# Patient Record
Sex: MALE | Race: WHITE | Employment: OTHER | URBAN - METROPOLITAN AREA
[De-identification: names, ages, dates, MRNs, and addresses within clinical notes are randomized per-mention and may not be internally consistent; named-entity substitution may affect disease eponyms.]

---

## 2017-04-17 ENCOUNTER — OP HISTORICAL/CONVERTED ENCOUNTER (OUTPATIENT)
Dept: OTHER | Age: 77
End: 2017-04-17

## 2017-12-22 ENCOUNTER — HOSPITAL ENCOUNTER (OUTPATIENT)
Dept: CT IMAGING | Age: 77
Discharge: HOME | End: 2017-12-22
Attending: ORTHOPAEDIC SURGERY
Payer: MEDICARE

## 2017-12-22 DIAGNOSIS — M19.012 PRIMARY OSTEOARTHRITIS OF LEFT SHOULDER: ICD-10-CM

## 2017-12-22 PROCEDURE — 73200 CT UPPER EXTREMITY W/O DYE: CPT

## 2018-01-29 ENCOUNTER — HOSPITAL ENCOUNTER (OUTPATIENT)
Dept: PREADMISSION TESTING | Age: 78
Discharge: HOME | End: 2018-01-29
Payer: MEDICARE

## 2018-01-29 VITALS
OXYGEN SATURATION: 97 % | TEMPERATURE: 98.2 F | DIASTOLIC BLOOD PRESSURE: 75 MMHG | RESPIRATION RATE: 16 BRPM | HEART RATE: 80 BPM | WEIGHT: 189.6 LBS | SYSTOLIC BLOOD PRESSURE: 156 MMHG | BODY MASS INDEX: 26.54 KG/M2 | HEIGHT: 71 IN

## 2018-01-29 LAB
ABO + RH BLD: NORMAL
ALBUMIN SERPL-MCNC: 4 G/DL (ref 3.5–5)
ALBUMIN/GLOB SERPL: 1.4 {RATIO} (ref 1.1–2.2)
ALP SERPL-CCNC: 77 U/L (ref 45–117)
ALT SERPL-CCNC: 31 U/L (ref 12–78)
ANION GAP SERPL CALC-SCNC: 7 MMOL/L (ref 5–15)
APPEARANCE UR: CLEAR
APTT PPP: 29.4 SEC (ref 22.1–32.5)
AST SERPL-CCNC: 19 U/L (ref 15–37)
ATRIAL RATE: 74 BPM
BACTERIA URNS QL MICRO: NEGATIVE /HPF
BASOPHILS # BLD: 0 K/UL (ref 0–0.1)
BASOPHILS NFR BLD: 1 % (ref 0–1)
BILIRUB SERPL-MCNC: 0.4 MG/DL (ref 0.2–1)
BILIRUB UR QL: NEGATIVE
BLOOD GROUP ANTIBODIES SERPL: NORMAL
BUN SERPL-MCNC: 23 MG/DL (ref 6–20)
BUN/CREAT SERPL: 24 (ref 12–20)
CALCIUM SERPL-MCNC: 8.9 MG/DL (ref 8.5–10.1)
CALCULATED P AXIS, ECG09: 47 DEGREES
CALCULATED T AXIS, ECG11: 6 DEGREES
CHLORIDE SERPL-SCNC: 105 MMOL/L (ref 97–108)
CO2 SERPL-SCNC: 32 MMOL/L (ref 21–32)
COLOR UR: NORMAL
CREAT SERPL-MCNC: 0.94 MG/DL (ref 0.7–1.3)
DIAGNOSIS, 93000: NORMAL
DIFFERENTIAL METHOD BLD: ABNORMAL
EOSINOPHIL # BLD: 0.1 K/UL (ref 0–0.4)
EOSINOPHIL NFR BLD: 2 % (ref 0–7)
EPITH CASTS URNS QL MICRO: NORMAL /LPF
ERYTHROCYTE [DISTWIDTH] IN BLOOD BY AUTOMATED COUNT: 14 % (ref 11.5–14.5)
EST. AVERAGE GLUCOSE BLD GHB EST-MCNC: 103 MG/DL
GLOBULIN SER CALC-MCNC: 2.8 G/DL (ref 2–4)
GLUCOSE SERPL-MCNC: 94 MG/DL (ref 65–100)
GLUCOSE UR STRIP.AUTO-MCNC: NEGATIVE MG/DL
HBA1C MFR BLD: 5.2 % (ref 4.2–6.3)
HCT VFR BLD AUTO: 37.9 % (ref 36.6–50.3)
HGB BLD-MCNC: 12.3 G/DL (ref 12.1–17)
HGB UR QL STRIP: NEGATIVE
HYALINE CASTS URNS QL MICRO: NORMAL /LPF (ref 0–5)
IMM GRANULOCYTES # BLD: 0 K/UL (ref 0–0.04)
IMM GRANULOCYTES NFR BLD AUTO: 1 % (ref 0–0.5)
INR PPP: 1 (ref 0.9–1.1)
KETONES UR QL STRIP.AUTO: NEGATIVE MG/DL
LEUKOCYTE ESTERASE UR QL STRIP.AUTO: NEGATIVE
LYMPHOCYTES # BLD: 1.3 K/UL (ref 0.8–3.5)
LYMPHOCYTES NFR BLD: 25 % (ref 12–49)
MCH RBC QN AUTO: 28.9 PG (ref 26–34)
MCHC RBC AUTO-ENTMCNC: 32.5 G/DL (ref 30–36.5)
MCV RBC AUTO: 89 FL (ref 80–99)
MONOCYTES # BLD: 0.5 K/UL (ref 0–1)
MONOCYTES NFR BLD: 10 % (ref 5–13)
NEUTS SEG # BLD: 3.3 K/UL (ref 1.8–8)
NEUTS SEG NFR BLD: 62 % (ref 32–75)
NITRITE UR QL STRIP.AUTO: NEGATIVE
NRBC # BLD: 0 K/UL (ref 0–0.01)
NRBC BLD-RTO: 0 PER 100 WBC
P-R INTERVAL, ECG05: 162 MS
PH UR STRIP: 5.5 [PH] (ref 5–8)
PLATELET # BLD AUTO: 202 K/UL (ref 150–400)
PMV BLD AUTO: 9.1 FL (ref 8.9–12.9)
POTASSIUM SERPL-SCNC: 4.2 MMOL/L (ref 3.5–5.1)
PROT SERPL-MCNC: 6.8 G/DL (ref 6.4–8.2)
PROT UR STRIP-MCNC: NEGATIVE MG/DL
PROTHROMBIN TIME: 10.3 SEC (ref 9–11.1)
Q-T INTERVAL, ECG07: 362 MS
QRS DURATION, ECG06: 88 MS
QTC CALCULATION (BEZET), ECG08: 401 MS
RBC # BLD AUTO: 4.26 M/UL (ref 4.1–5.7)
RBC #/AREA URNS HPF: NORMAL /HPF (ref 0–5)
SODIUM SERPL-SCNC: 144 MMOL/L (ref 136–145)
SP GR UR REFRACTOMETRY: 1.02 (ref 1–1.03)
SPECIMEN EXP DATE BLD: NORMAL
THERAPEUTIC RANGE,PTTT: NORMAL SECS (ref 58–77)
UA: UC IF INDICATED,UAUC: NORMAL
UROBILINOGEN UR QL STRIP.AUTO: 0.2 EU/DL (ref 0.2–1)
VENTRICULAR RATE, ECG03: 74 BPM
WBC # BLD AUTO: 5.3 K/UL (ref 4.1–11.1)
WBC URNS QL MICRO: NORMAL /HPF (ref 0–4)

## 2018-01-29 PROCEDURE — 93005 ELECTROCARDIOGRAM TRACING: CPT

## 2018-01-29 PROCEDURE — 85610 PROTHROMBIN TIME: CPT | Performed by: ORTHOPAEDIC SURGERY

## 2018-01-29 PROCEDURE — 85730 THROMBOPLASTIN TIME PARTIAL: CPT | Performed by: ORTHOPAEDIC SURGERY

## 2018-01-29 PROCEDURE — 86900 BLOOD TYPING SEROLOGIC ABO: CPT | Performed by: ORTHOPAEDIC SURGERY

## 2018-01-29 PROCEDURE — 80053 COMPREHEN METABOLIC PANEL: CPT | Performed by: ORTHOPAEDIC SURGERY

## 2018-01-29 PROCEDURE — 36415 COLL VENOUS BLD VENIPUNCTURE: CPT | Performed by: ORTHOPAEDIC SURGERY

## 2018-01-29 PROCEDURE — 81001 URINALYSIS AUTO W/SCOPE: CPT | Performed by: ORTHOPAEDIC SURGERY

## 2018-01-29 PROCEDURE — 83036 HEMOGLOBIN GLYCOSYLATED A1C: CPT | Performed by: ORTHOPAEDIC SURGERY

## 2018-01-29 PROCEDURE — 85025 COMPLETE CBC W/AUTO DIFF WBC: CPT | Performed by: ORTHOPAEDIC SURGERY

## 2018-01-29 RX ORDER — VALSARTAN 320 MG/1
320 TABLET ORAL EVERY EVENING
COMMUNITY

## 2018-01-29 RX ORDER — SIMVASTATIN 10 MG/1
10 TABLET, FILM COATED ORAL
COMMUNITY
End: 2022-03-03

## 2018-01-29 RX ORDER — AMLODIPINE BESYLATE 5 MG/1
5 TABLET ORAL
COMMUNITY

## 2018-01-29 NOTE — PERIOP NOTES
[facility]  PREOPERATIVE INSTRUCTIONS    Surgery Date:   2/12/2018   Surgery arrival time given by surgeon: NO  (If [facility] staff will call you between 3pm - 7pm the day before surgery with your arrival time. If your surgery is on a Monday, we will call you the preceding Friday. Please call [phone] after 7pm if you did not receive your arrival time.)  1. Report  to the 2nd Floor Admitting Desk on the day of your surgery. Bring your insurance card, photo identification, and any copayment (if applicable). 2. You must have a responsible adult to drive you home and stay with you the first 24 hours after surgery if you are going home the same day of your surgery. 3. Nothing to eat or drink after midnight the night before surgery. This means NO water, gum, mints, coffee, juice, etc.    4. MEDICATIONS TO TAKE THE MORNING OF SURGERY WITH A SIP OF WATER:Take Amlodipine  Check with your cardiologist about stopping aspirin. Stop vitamins 7 days before. 5. No alcoholic beverages 24 hours before and after your surgery. 6. If you are being admitted to the hospital,please leave personal belongings/luggage in your car until you have an assigned hospital room number. ( The hospital discharge time is 12 PM NOON. Your adult  should be at the hospital prior to the noon discharge time unless otherwise instructed.)   7. STOP Aspirin and/or any non-steroidal anti-inflammatory drugs (i.e. Ibuprofen, Naproxen, Advil, Aleve) as directed by your surgeon. You may take Tylenol. Stop herbal supplements 1 week prior to  surgery. 8. If you are currently taking Plavix, Coumadin,or any other blood-thinning/ anticoagulant medication contact your surgeon for instructions. 9. Wear comfortable clothes. Wear your glasses instead of contacts. Please leave all money, jewelry and valuables at home. No make up, particularly mascara, the day of surgery. 10.  REMOVE ALL body piercings, rings,and jewelry and leave at home.   Wear your hair loose or down, no pony-tails, buns, or any metal hair clips. 11. If you shower the morning of surgery, please do not apply any lotions, powders, or deodorants afterwards. Do not shave any body area within 24 hours of your surgery. 12. Please follow all instructions to avoid any potential surgical cancellation. 13. Should your physical condition change, (i.e. fever, cold, flu, etc.) please notify your surgeon as soon as possible. 14. It is important to be on time. If a situation occurs where you may be delayed, please call:  [phone] / [phone] on the day of surgery. 15. The Preadmission Testing staff can be reached at 21 [phone]. 16.  Special Instructions:  · Use Chlorhexidine Care Fusion wash and sponges 3 days prior to surgery as instructed. · Incentive spirometer given with instructions to practice at home and bring back to the hospital on the day of surgery. · [facility] will contact you if your Hemoglobin A1C is greater than 7.5.  parking is complimentary Monday - Friday 7 am - 5 pm  · Bring PTA Medication list day of surgery with the last doses taken documented   · Do not bring medication bottles the day of surgery  16. The patient was contacted  in person. He  verbalize  understanding of all instructions does not  need reinforcement.

## 2018-01-29 NOTE — H&P
PAT Pre-Op History & Physical    Patient: Claire Bowman. MRN: [id number]          SSN: [id number]  YOB: 1940          Age: 70 y.o. Sex: male                Subjective:   Patient is a 70 y.o.  male who presents with history of chronic left shoulder pain that began about 2 years ago and has escalated over time. Describes his left shoulder pain as an intermittent ache but can be sharp with movement. Rates his pain 3/10. States that his left shoulder pain makes it difficult to sleep at night. Ct of left UE done 12/22/2017 showed:    Severe left shoulder osteoarthritis. Mild volume loss of the glenoid. Large glenohumeral joint effusion. No aggressive bone lesion. Has failed steroid joint injections and NSAIDs. Patient is right hand dominant. The patient was evaluated in the surgeon's office and it was determined that the most appropriate plan of care is to proceed with surgical intervention.   Patient's PCP Tiago Lucas III, MD            Past Medical History:   Diagnosis Date    Arthritis     Cataracts, bilateral     Chronic pain     back    Hypercholesteremia     Hypertension     Ill-defined condition     high cholesterol    Ill-defined condition     macular degeneration    Macular degeneration     Other ill-defined conditions(799.89) 1966    numerous blood transfusions - raymundo nam- injury to right leg    Other ill-defined conditions(799.89) 2011    4 units PRBCs in [place] for knee revision    Unspecified adverse effect of anesthesia 1966    convulsion post op leg surgery x 1    Unspecified adverse effect of anesthesia     no further convulsions with numerous subsequent surgeruies      Past Surgical History:   Procedure Laterality Date    HX ADENOIDECTOMY      HX ORTHOPAEDIC  1966    right leg-x 10 surgeries [facility] nam injury    HX ORTHOPAEDIC  1997    right knee replacement    HX ORTHOPAEDIC  2011, 2017    right knee revision    HX ORTHOPAEDIC  01/2015    left knee arthroscopy    HX ORTHOPAEDIC  11/2015    left knee replacement    HX ORTHOPAEDIC  03/2015    left elbow and carpal tunnel    HX OTHER SURGICAL  2004    normal cardiac cath      Prior to Admission medications    Medication Sig Start Date End Date Taking? Authorizing Provider   simvastatin (ZOCOR) 10 mg tablet Take 10 mg by mouth nightly. Yes Historical Provider   VIT A/VIT C/VIT E/ZINC/COPPER (PRESERVISION AREDS PO) Take 1 Tab by mouth two (2) times a day. Yes Historical Provider   valsartan (DIOVAN) 320 mg tablet Take 320 mg by mouth every evening. Yes Historical Provider   amLODIPine (NORVASC) 5 mg tablet Take 5 mg by mouth every morning. Yes Historical Provider   aspirin delayed-release 81 mg tablet Take 81 mg by mouth every evening. Historical Provider     Current Outpatient Prescriptions   Medication Sig    simvastatin (ZOCOR) 10 mg tablet Take 10 mg by mouth nightly.  VIT A/VIT C/VIT E/ZINC/COPPER (PRESERVISION AREDS PO) Take 1 Tab by mouth two (2) times a day.  valsartan (DIOVAN) 320 mg tablet Take 320 mg by mouth every evening.  amLODIPine (NORVASC) 5 mg tablet Take 5 mg by mouth every morning.  aspirin delayed-release 81 mg tablet Take 81 mg by mouth every evening. No current facility-administered medications for this encounter. No Known Allergies   Social History   Substance Use Topics    Smoking status: Never Smoker    Smokeless tobacco: Never Used    Alcohol use 2.5 oz/week     5 Glasses of wine per week      Comment:        History   Drug Use No     Family History   Problem Relation Age of Onset    Cancer Mother     Other Mother      polio in infancy    Hypertension Father     Heart Disease Father     No Known Problems Sister     No Known Problems Brother     Cancer Sister      panceratic    No Known Problems Sister          Review of Systems    Patient denies difficulty swallowing, mouth sores, or loose teeth.  Patient denies any recent dental procedures or any planned prior to surgery. Patient denies chest pain, tightness, pain radiating down left arm, palpitations. Denies dizziness, visual disturbances, or lightheadedness. Patient denies shortness of breath, wheezing, cough, fever, or chills. Patient denies diarrhea, constipation, or abdominal pain. Patient denies urinary problems including dysuria, hesitancy, urgency, or incontinence. Denies skin breakdown, rashes, insect bites or open area. C/o left shoulder pain and lower back pain, also pain right shin. Objective:   Patient Vitals for the past 24 hrs:   Temp Pulse Resp BP SpO2   18 0851 98.2 °F (36.8 °C) 80 16 156/75 97 %     Temp (24hrs), Av.2 °F (36.8 °C), Min:98.2 °F (36.8 °C), Max:98.2 °F (36.8 °C)    Body mass index is 26.44 kg/(m^2). Wt Readings from Last 1 Encounters:   18 86 kg (189 lb 9.5 oz)        Physical Exam:     General: Pleasant,  cooperative, no apparent distress, appears stated age. Eyes: Conjunctivae/corneas clear. EOMs intact. Nose: Nares normal.   Mouth/Throat: Lips, mucosa, and tongue normal. Teeth and gums normal.   Neck: Supple, symmetrical, trachea midline. Back: Symmetric   Lungs: Clear to auscultation bilaterally. Heart: Regular rate and rhythm, S1, S2 normal- + murmur - no click, gallop, or rub noted. Abdomen: Soft, non-tender. Bowel sounds normal. No distention. Musculoskeletal:  Left shoulder ROM limited by discomfort. Extremities:  Extremities normal, atraumatic, no cyanosis or edema. Calves                                 supple, non tender to palpation. Pulses: 2+ and symmetric bilateral upper extremities. Cap. refill <2 seconds   Skin: Skin color, texture, turgor normal.  No rashes or lesions. Neurologic: CN II-XII grossly intact. Alert and oriented x3.     Labs:   Recent Results (from the past 72 hour(s))   CBC WITH AUTOMATED DIFF    Collection Time: 18  9:48 AM   Result Value Ref Range WBC 5.3 4.1 - 11.1 K/uL    RBC 4.26 4.10 - 5.70 M/uL    HGB 12.3 12.1 - 17.0 g/dL    HCT 37.9 36.6 - 50.3 %    MCV 89.0 80.0 - 99.0 FL    MCH 28.9 26.0 - 34.0 PG    MCHC 32.5 30.0 - 36.5 g/dL    RDW 14.0 11.5 - 14.5 %    PLATELET 578 778 - 300 K/uL    MPV 9.1 8.9 - 12.9 FL    NRBC 0.0 0  WBC    ABSOLUTE NRBC 0.00 0.00 - 0.01 K/uL    NEUTROPHILS 62 32 - 75 %    LYMPHOCYTES 25 12 - 49 %    MONOCYTES 10 5 - 13 %    EOSINOPHILS 2 0 - 7 %    BASOPHILS 1 0 - 1 %    IMMATURE GRANULOCYTES 1 (H) 0.0 - 0.5 %    ABS. NEUTROPHILS 3.3 1.8 - 8.0 K/UL    ABS. LYMPHOCYTES 1.3 0.8 - 3.5 K/UL    ABS. MONOCYTES 0.5 0.0 - 1.0 K/UL    ABS. EOSINOPHILS 0.1 0.0 - 0.4 K/UL    ABS. BASOPHILS 0.0 0.0 - 0.1 K/UL    ABS. IMM. GRANS. 0.0 0.00 - 0.04 K/UL    DF AUTOMATED     METABOLIC PANEL, COMPREHENSIVE    Collection Time: 01/29/18  9:48 AM   Result Value Ref Range    Sodium 144 136 - 145 mmol/L    Potassium 4.2 3.5 - 5.1 mmol/L    Chloride 105 97 - 108 mmol/L    CO2 32 21 - 32 mmol/L    Anion gap 7 5 - 15 mmol/L    Glucose 94 65 - 100 mg/dL    BUN 23 (H) 6 - 20 MG/DL    Creatinine 0.94 0.70 - 1.30 MG/DL    BUN/Creatinine ratio 24 (H) 12 - 20      GFR est AA >60 >60 ml/min/1.73m2    GFR est non-AA >60 >60 ml/min/1.73m2    Calcium 8.9 8.5 - 10.1 MG/DL    Bilirubin, total 0.4 0.2 - 1.0 MG/DL    ALT (SGPT) 31 12 - 78 U/L    AST (SGOT) 19 15 - 37 U/L    Alk.  phosphatase 77 45 - 117 U/L    Protein, total 6.8 6.4 - 8.2 g/dL    Albumin 4.0 3.5 - 5.0 g/dL    Globulin 2.8 2.0 - 4.0 g/dL    A-G Ratio 1.4 1.1 - 2.2     HEMOGLOBIN A1C WITH EAG    Collection Time: 01/29/18  9:48 AM   Result Value Ref Range    Hemoglobin A1c 5.2 4.2 - 6.3 %    Est. average glucose 103 mg/dL   PROTHROMBIN TIME + INR    Collection Time: 01/29/18  9:48 AM   Result Value Ref Range    INR 1.0 0.9 - 1.1      Prothrombin time 10.3 9.0 - 11.1 sec   PTT    Collection Time: 01/29/18  9:48 AM   Result Value Ref Range    aPTT 29.4 22.1 - 32.5 sec    aPTT, therapeutic range 58.0 - 77.0 SECS   URINALYSIS W/ REFLEX CULTURE    Collection Time: 01/29/18  9:48 AM   Result Value Ref Range    Color YELLOW/STRAW      Appearance CLEAR CLEAR      Specific gravity 1.020 1.003 - 1.030      pH (UA) 5.5 5.0 - 8.0      Protein NEGATIVE  NEG mg/dL    Glucose NEGATIVE  NEG mg/dL    Ketone NEGATIVE  NEG mg/dL    Bilirubin NEGATIVE  NEG      Blood NEGATIVE  NEG      Urobilinogen 0.2 0.2 - 1.0 EU/dL    Nitrites NEGATIVE  NEG      Leukocyte Esterase NEGATIVE  NEG      WBC 0-4 0 - 4 /hpf    RBC 0-5 0 - 5 /hpf    Epithelial cells FEW FEW /lpf    Bacteria NEGATIVE  NEG /hpf    UA:UC IF INDICATED CULTURE NOT INDICATED BY UA RESULT CNI      Hyaline cast 0-2 0 - 5 /lpf   TYPE & SCREEN    Collection Time: 01/29/18  9:48 AM   Result Value Ref Range    Crossmatch Expiration 02/12/2018     ABO/Rh(D) Taryn More POSITIVE     Antibody screen NEG    EKG, 12 LEAD, INITIAL    Collection Time: 01/29/18 10:30 AM   Result Value Ref Range    Ventricular Rate 74 BPM    Atrial Rate 74 BPM    P-R Interval 162 ms    QRS Duration 88 ms    Q-T Interval 362 ms    QTC Calculation (Bezet) 401 ms    Calculated P Axis 47 degrees    Calculated T Axis 6 degrees    Diagnosis       Normal sinus rhythm  Cannot rule out Septal infarct , age undetermined  Abnormal ECG  When compared with ECG of 3/9/15  No significant change    Confirmed by Jennifer Quintana MD., Gabby Carmona ([id number]) on 1/29/2018 11:27:51 AM         Assessment:     Primary osteoarthritis of left total shoulder arthroplasty with biceps tenodesis and axillary nerve dissection. shoulder. Plan:     Scheduled for left shoulder total arthroplasty biceps tenodesis and axillary node dissection. Labs and EKG done per surgeon's orders. Lab results and EKG reviewed- unremarkable. MRSA pending. Patient has appointment with cardiology (Dr. Alvin Hand) 01/30/2018. Will request copy of note after visit.         Georgina Trammell, BRAXTON

## 2018-01-30 LAB
BACTERIA SPEC CULT: ABNORMAL
BACTERIA SPEC CULT: ABNORMAL
SERVICE CMNT-IMP: ABNORMAL

## 2018-01-30 NOTE — PROGRESS NOTES
Received last cardiology note prior to PAT visit- dated 07/12/2017 by Dr. Hernán Xiong. Note mentions history of TIA. Called patient to clarify- he states he has episode of numbness on side of lip. No cause ever identified- Was never given diagnosis of TIA per patient report. Received cardiac clearance note from Dr. Bashir Siemens office after 1/30 appointment- note placed on chart.

## 2018-01-31 RX ORDER — MUPIROCIN 20 MG/G
OINTMENT TOPICAL 2 TIMES DAILY
Qty: 22 G | Refills: 0 | Status: SHIPPED | OUTPATIENT
Start: 2018-01-31 | End: 2018-02-05

## 2018-01-31 NOTE — ROUTINE PROCESS
Notification of Positive MSSA and Treatment Ordered by Preadmission Testing Nurse Practitioner      Patient Name:  Bernard Rocha MRN: [id number]  : 1940    Surgeon: Dr. Sanju Pena  Date of surgery: 2018  Procedure: Left TSA    Allergies: No Known Allergies    MRSA results: All Micro Results     Procedure Component Value Units Date/Time    MRSA CULTURE NARES [[id number]]  (Abnormal) Collected:  18 0856    Order Status:  Completed Specimen:  Nares Updated:  18 1535     Special Requests: NO SPECIAL REQUESTS        Culture result:         MRSA NOT PRESENT. Apparent Staphylococus aureus (not MRSA noted).  (A)                        Treatment ordered: Bactroban ointment 2%- apply intranasal twice daily for 5 days    Date patient notified of results / treatment prescribed: 2018- spoke with wife Herbert Galindo)    The patient was instructed to add medication and date they began treatment to their \"Prior to Admission Medication\" list given to them in [address] [place] [address],

## 2018-02-09 ENCOUNTER — ANESTHESIA EVENT (OUTPATIENT)
Dept: SURGERY | Age: 78
DRG: 483 | End: 2018-02-09
Payer: MEDICARE

## 2018-02-12 ENCOUNTER — HOSPITAL ENCOUNTER (INPATIENT)
Age: 78
LOS: 1 days | Discharge: HOME | DRG: 483 | End: 2018-02-13
Attending: ORTHOPAEDIC SURGERY | Admitting: ORTHOPAEDIC SURGERY
Payer: MEDICARE

## 2018-02-12 ENCOUNTER — APPOINTMENT (OUTPATIENT)
Dept: GENERAL RADIOLOGY | Age: 78
DRG: 483 | End: 2018-02-12
Attending: ORTHOPAEDIC SURGERY
Payer: MEDICARE

## 2018-02-12 ENCOUNTER — ANESTHESIA (OUTPATIENT)
Dept: SURGERY | Age: 78
DRG: 483 | End: 2018-02-12
Payer: MEDICARE

## 2018-02-12 DIAGNOSIS — M19.012 PRIMARY OSTEOARTHRITIS OF LEFT SHOULDER: Primary | ICD-10-CM

## 2018-02-12 PROCEDURE — 65270000029 HC RM PRIVATE

## 2018-02-12 PROCEDURE — 77030018883 HC BLD SAW SAG4 STRY -B: Performed by: ORTHOPAEDIC SURGERY

## 2018-02-12 PROCEDURE — 74011000250 HC RX REV CODE- 250

## 2018-02-12 PROCEDURE — 77030003652 HC NDL RC MINI BIOM -B: Performed by: ORTHOPAEDIC SURGERY

## 2018-02-12 PROCEDURE — 3E0T3BZ INTRODUCTION OF ANESTHETIC AGENT INTO PERIPHERAL NERVES AND PLEXI, PERCUTANEOUS APPROACH: ICD-10-PCS | Performed by: ANESTHESIOLOGY

## 2018-02-12 PROCEDURE — 74011250636 HC RX REV CODE- 250/636: Performed by: ANESTHESIOLOGY

## 2018-02-12 PROCEDURE — 77030013079 HC BLNKT BAIR HGGR 3M -A: Performed by: NURSE ANESTHETIST, CERTIFIED REGISTERED

## 2018-02-12 PROCEDURE — C1713 ANCHOR/SCREW BN/BN,TIS/BN: HCPCS | Performed by: ORTHOPAEDIC SURGERY

## 2018-02-12 PROCEDURE — 36415 COLL VENOUS BLD VENIPUNCTURE: CPT | Performed by: ORTHOPAEDIC SURGERY

## 2018-02-12 PROCEDURE — 77030008684 HC TU ET CUF COVD -B: Performed by: NURSE ANESTHETIST, CERTIFIED REGISTERED

## 2018-02-12 PROCEDURE — 77030002933 HC SUT MCRYL J&J -A: Performed by: ORTHOPAEDIC SURGERY

## 2018-02-12 PROCEDURE — 77030020782 HC GWN BAIR PAWS FLX 3M -B

## 2018-02-12 PROCEDURE — 85018 HEMOGLOBIN: CPT | Performed by: ORTHOPAEDIC SURGERY

## 2018-02-12 PROCEDURE — 74011000250 HC RX REV CODE- 250: Performed by: ORTHOPAEDIC SURGERY

## 2018-02-12 PROCEDURE — 77030026438 HC STYL ET INTUB CARD -A: Performed by: NURSE ANESTHETIST, CERTIFIED REGISTERED

## 2018-02-12 PROCEDURE — 77030032497 HC WRP SHLDR WO BGS SOLM -B

## 2018-02-12 PROCEDURE — 76210000006 HC OR PH I REC 0.5 TO 1 HR: Performed by: ORTHOPAEDIC SURGERY

## 2018-02-12 PROCEDURE — 77030018673: Performed by: ORTHOPAEDIC SURGERY

## 2018-02-12 PROCEDURE — 0LS40ZZ REPOSITION LEFT UPPER ARM TENDON, OPEN APPROACH: ICD-10-PCS | Performed by: ORTHOPAEDIC SURGERY

## 2018-02-12 PROCEDURE — 77030011640 HC PAD GRND REM COVD -A: Performed by: ORTHOPAEDIC SURGERY

## 2018-02-12 PROCEDURE — 74011000272 HC RX REV CODE- 272: Performed by: ORTHOPAEDIC SURGERY

## 2018-02-12 PROCEDURE — 73020 X-RAY EXAM OF SHOULDER: CPT

## 2018-02-12 PROCEDURE — 77030013708 HC HNDPC SUC IRR PULS STRY –B: Performed by: ORTHOPAEDIC SURGERY

## 2018-02-12 PROCEDURE — 77030010507 HC ADH SKN DERMBND J&J -B: Performed by: ORTHOPAEDIC SURGERY

## 2018-02-12 PROCEDURE — 76010000162 HC OR TIME 1.5 TO 2 HR INTENSV-TIER 1: Performed by: ORTHOPAEDIC SURGERY

## 2018-02-12 PROCEDURE — 74011250636 HC RX REV CODE- 250/636

## 2018-02-12 PROCEDURE — 77030018836 HC SOL IRR NACL ICUM -A: Performed by: ORTHOPAEDIC SURGERY

## 2018-02-12 PROCEDURE — 77030013837 HC NERV BLK KT BBMI -B

## 2018-02-12 PROCEDURE — 77030002922 HC SUT FBRWRE ARTH -B: Performed by: ORTHOPAEDIC SURGERY

## 2018-02-12 PROCEDURE — 77030031139 HC SUT VCRL2 J&J -A: Performed by: ORTHOPAEDIC SURGERY

## 2018-02-12 PROCEDURE — 77030019908 HC STETH ESOPH SIMS -A: Performed by: ANESTHESIOLOGY

## 2018-02-12 PROCEDURE — 77030010785: Performed by: ORTHOPAEDIC SURGERY

## 2018-02-12 PROCEDURE — 74011000250 HC RX REV CODE- 250: Performed by: ANESTHESIOLOGY

## 2018-02-12 PROCEDURE — 74011250636 HC RX REV CODE- 250/636: Performed by: ORTHOPAEDIC SURGERY

## 2018-02-12 PROCEDURE — 77030003882 HC BIT DRL TWST BRSM -B: Performed by: ORTHOPAEDIC SURGERY

## 2018-02-12 PROCEDURE — 0RRK0JZ REPLACEMENT OF LEFT SHOULDER JOINT WITH SYNTHETIC SUBSTITUTE, OPEN APPROACH: ICD-10-PCS | Performed by: ORTHOPAEDIC SURGERY

## 2018-02-12 PROCEDURE — 77030020788: Performed by: ORTHOPAEDIC SURGERY

## 2018-02-12 PROCEDURE — 76060000034 HC ANESTHESIA 1.5 TO 2 HR: Performed by: ORTHOPAEDIC SURGERY

## 2018-02-12 PROCEDURE — 77030021303 HC BUR FLUT MIDAS BRSM -B: Performed by: ORTHOPAEDIC SURGERY

## 2018-02-12 PROCEDURE — 64415 NJX AA&/STRD BRCH PLXS IMG: CPT

## 2018-02-12 PROCEDURE — 77030010507 HC ADH SKN DERMBND J&J -B

## 2018-02-12 PROCEDURE — 74011250637 HC RX REV CODE- 250/637: Performed by: ANESTHESIOLOGY

## 2018-02-12 PROCEDURE — 77030028700 HC BLD TISS PLSM MEDT -E: Performed by: ORTHOPAEDIC SURGERY

## 2018-02-12 PROCEDURE — 74011250637 HC RX REV CODE- 250/637: Performed by: ORTHOPAEDIC SURGERY

## 2018-02-12 PROCEDURE — C1776 JOINT DEVICE (IMPLANTABLE): HCPCS | Performed by: ORTHOPAEDIC SURGERY

## 2018-02-12 DEVICE — IMPLANTABLE DEVICE: Type: IMPLANTABLE DEVICE | Site: SHOULDER | Status: FUNCTIONAL

## 2018-02-12 RX ORDER — POLYETHYLENE GLYCOL 3350 17 G/17G
17 POWDER, FOR SOLUTION ORAL DAILY
Status: DISCONTINUED | OUTPATIENT
Start: 2018-02-12 | End: 2018-02-13 | Stop reason: HOSPADM

## 2018-02-12 RX ORDER — VALSARTAN 80 MG/1
320 TABLET ORAL EVERY EVENING
Status: DISCONTINUED | OUTPATIENT
Start: 2018-02-12 | End: 2018-02-13 | Stop reason: HOSPADM

## 2018-02-12 RX ORDER — ACETAMINOPHEN 325 MG/1
975 TABLET ORAL ONCE
Status: COMPLETED | OUTPATIENT
Start: 2018-02-12 | End: 2018-02-12

## 2018-02-12 RX ORDER — HYDROMORPHONE HYDROCHLORIDE 2 MG/ML
0.5 INJECTION, SOLUTION INTRAMUSCULAR; INTRAVENOUS; SUBCUTANEOUS
Status: ACTIVE | OUTPATIENT
Start: 2018-02-12 | End: 2018-02-13

## 2018-02-12 RX ORDER — OXYCODONE HYDROCHLORIDE 5 MG/1
10 TABLET ORAL
Status: DISCONTINUED | OUTPATIENT
Start: 2018-02-12 | End: 2018-02-13 | Stop reason: HOSPADM

## 2018-02-12 RX ORDER — DEXAMETHASONE SODIUM PHOSPHATE 4 MG/ML
INJECTION, SOLUTION INTRA-ARTICULAR; INTRALESIONAL; INTRAMUSCULAR; INTRAVENOUS; SOFT TISSUE AS NEEDED
Status: DISCONTINUED | OUTPATIENT
Start: 2018-02-12 | End: 2018-02-12 | Stop reason: HOSPADM

## 2018-02-12 RX ORDER — SODIUM CHLORIDE, SODIUM LACTATE, POTASSIUM CHLORIDE, CALCIUM CHLORIDE 600; 310; 30; 20 MG/100ML; MG/100ML; MG/100ML; MG/100ML
125 INJECTION, SOLUTION INTRAVENOUS CONTINUOUS
Status: DISCONTINUED | OUTPATIENT
Start: 2018-02-12 | End: 2018-02-12 | Stop reason: HOSPADM

## 2018-02-12 RX ORDER — SODIUM CHLORIDE 0.9 % (FLUSH) 0.9 %
5-10 SYRINGE (ML) INJECTION AS NEEDED
Status: DISCONTINUED | OUTPATIENT
Start: 2018-02-12 | End: 2018-02-12 | Stop reason: HOSPADM

## 2018-02-12 RX ORDER — SODIUM CHLORIDE 0.9 % (FLUSH) 0.9 %
5-10 SYRINGE (ML) INJECTION EVERY 8 HOURS
Status: DISCONTINUED | OUTPATIENT
Start: 2018-02-12 | End: 2018-02-12 | Stop reason: HOSPADM

## 2018-02-12 RX ORDER — SUCCINYLCHOLINE CHLORIDE 20 MG/ML
INJECTION INTRAMUSCULAR; INTRAVENOUS AS NEEDED
Status: DISCONTINUED | OUTPATIENT
Start: 2018-02-12 | End: 2018-02-12 | Stop reason: HOSPADM

## 2018-02-12 RX ORDER — SODIUM CHLORIDE 0.9 % (FLUSH) 0.9 %
5-10 SYRINGE (ML) INJECTION AS NEEDED
Status: DISCONTINUED | OUTPATIENT
Start: 2018-02-12 | End: 2018-02-13 | Stop reason: HOSPADM

## 2018-02-12 RX ORDER — SIMVASTATIN 5 MG/1
10 TABLET, FILM COATED ORAL
Status: DISCONTINUED | OUTPATIENT
Start: 2018-02-12 | End: 2018-02-13 | Stop reason: HOSPADM

## 2018-02-12 RX ORDER — FAMOTIDINE 20 MG/1
20 TABLET, FILM COATED ORAL 2 TIMES DAILY
Status: DISCONTINUED | OUTPATIENT
Start: 2018-02-12 | End: 2018-02-13 | Stop reason: HOSPADM

## 2018-02-12 RX ORDER — PROPOFOL 10 MG/ML
INJECTION, EMULSION INTRAVENOUS AS NEEDED
Status: DISCONTINUED | OUTPATIENT
Start: 2018-02-12 | End: 2018-02-12 | Stop reason: HOSPADM

## 2018-02-12 RX ORDER — OXYCODONE HYDROCHLORIDE 5 MG/1
5 TABLET ORAL
Status: DISCONTINUED | OUTPATIENT
Start: 2018-02-12 | End: 2018-02-13 | Stop reason: HOSPADM

## 2018-02-12 RX ORDER — KETOROLAC TROMETHAMINE 30 MG/ML
15 INJECTION, SOLUTION INTRAMUSCULAR; INTRAVENOUS
Status: DISCONTINUED | OUTPATIENT
Start: 2018-02-12 | End: 2018-02-12 | Stop reason: HOSPADM

## 2018-02-12 RX ORDER — TRAMADOL HYDROCHLORIDE 50 MG/1
50 TABLET ORAL
Status: DISCONTINUED | OUTPATIENT
Start: 2018-02-12 | End: 2018-02-13 | Stop reason: HOSPADM

## 2018-02-12 RX ORDER — HYDROMORPHONE HYDROCHLORIDE 2 MG/ML
.5-1 INJECTION, SOLUTION INTRAMUSCULAR; INTRAVENOUS; SUBCUTANEOUS
Status: DISCONTINUED | OUTPATIENT
Start: 2018-02-12 | End: 2018-02-12 | Stop reason: HOSPADM

## 2018-02-12 RX ORDER — ONDANSETRON 2 MG/ML
INJECTION INTRAMUSCULAR; INTRAVENOUS AS NEEDED
Status: DISCONTINUED | OUTPATIENT
Start: 2018-02-12 | End: 2018-02-12 | Stop reason: HOSPADM

## 2018-02-12 RX ORDER — CEFAZOLIN SODIUM/WATER 2 G/20 ML
2 SYRINGE (ML) INTRAVENOUS ONCE
Status: COMPLETED | OUTPATIENT
Start: 2018-02-12 | End: 2018-02-12

## 2018-02-12 RX ORDER — AMOXICILLIN 250 MG
1 CAPSULE ORAL 2 TIMES DAILY
Status: DISCONTINUED | OUTPATIENT
Start: 2018-02-12 | End: 2018-02-13 | Stop reason: HOSPADM

## 2018-02-12 RX ORDER — FENTANYL CITRATE 50 UG/ML
INJECTION, SOLUTION INTRAMUSCULAR; INTRAVENOUS AS NEEDED
Status: DISCONTINUED | OUTPATIENT
Start: 2018-02-12 | End: 2018-02-12 | Stop reason: HOSPADM

## 2018-02-12 RX ORDER — ROCURONIUM BROMIDE 10 MG/ML
INJECTION, SOLUTION INTRAVENOUS AS NEEDED
Status: DISCONTINUED | OUTPATIENT
Start: 2018-02-12 | End: 2018-02-12 | Stop reason: HOSPADM

## 2018-02-12 RX ORDER — NALOXONE HYDROCHLORIDE 0.4 MG/ML
0.4 INJECTION, SOLUTION INTRAMUSCULAR; INTRAVENOUS; SUBCUTANEOUS AS NEEDED
Status: DISCONTINUED | OUTPATIENT
Start: 2018-02-12 | End: 2018-02-13 | Stop reason: HOSPADM

## 2018-02-12 RX ORDER — ROPIVACAINE HYDROCHLORIDE 5 MG/ML
INJECTION, SOLUTION EPIDURAL; INFILTRATION; PERINEURAL AS NEEDED
Status: DISCONTINUED | OUTPATIENT
Start: 2018-02-12 | End: 2018-02-12 | Stop reason: HOSPADM

## 2018-02-12 RX ORDER — EPHEDRINE SULFATE 50 MG/ML
INJECTION, SOLUTION INTRAVENOUS AS NEEDED
Status: DISCONTINUED | OUTPATIENT
Start: 2018-02-12 | End: 2018-02-12 | Stop reason: HOSPADM

## 2018-02-12 RX ORDER — SODIUM CHLORIDE 9 MG/ML
125 INJECTION, SOLUTION INTRAVENOUS CONTINUOUS
Status: DISPENSED | OUTPATIENT
Start: 2018-02-12 | End: 2018-02-13

## 2018-02-12 RX ORDER — CELECOXIB 100 MG/1
100 CAPSULE ORAL ONCE
Status: COMPLETED | OUTPATIENT
Start: 2018-02-12 | End: 2018-02-12

## 2018-02-12 RX ORDER — DIPHENHYDRAMINE HCL 12.5MG/5ML
12.5 ELIXIR ORAL
Status: ACTIVE | OUTPATIENT
Start: 2018-02-12 | End: 2018-02-13

## 2018-02-12 RX ORDER — FENTANYL CITRATE 50 UG/ML
25 INJECTION, SOLUTION INTRAMUSCULAR; INTRAVENOUS
Status: DISCONTINUED | OUTPATIENT
Start: 2018-02-12 | End: 2018-02-12 | Stop reason: HOSPADM

## 2018-02-12 RX ORDER — NEOSTIGMINE METHYLSULFATE 1 MG/ML
INJECTION INTRAVENOUS AS NEEDED
Status: DISCONTINUED | OUTPATIENT
Start: 2018-02-12 | End: 2018-02-12 | Stop reason: HOSPADM

## 2018-02-12 RX ORDER — AMLODIPINE BESYLATE 5 MG/1
5 TABLET ORAL
Status: DISCONTINUED | OUTPATIENT
Start: 2018-02-12 | End: 2018-02-12

## 2018-02-12 RX ORDER — LIDOCAINE HYDROCHLORIDE 10 MG/ML
0.1 INJECTION, SOLUTION EPIDURAL; INFILTRATION; INTRACAUDAL; PERINEURAL AS NEEDED
Status: DISCONTINUED | OUTPATIENT
Start: 2018-02-12 | End: 2018-02-12 | Stop reason: HOSPADM

## 2018-02-12 RX ORDER — ONDANSETRON 2 MG/ML
4 INJECTION INTRAMUSCULAR; INTRAVENOUS AS NEEDED
Status: DISCONTINUED | OUTPATIENT
Start: 2018-02-12 | End: 2018-02-12 | Stop reason: HOSPADM

## 2018-02-12 RX ORDER — ACETAMINOPHEN 325 MG/1
650 TABLET ORAL EVERY 6 HOURS
Status: DISCONTINUED | OUTPATIENT
Start: 2018-02-12 | End: 2018-02-13 | Stop reason: HOSPADM

## 2018-02-12 RX ORDER — AMLODIPINE BESYLATE 5 MG/1
5 TABLET ORAL DAILY
Status: DISCONTINUED | OUTPATIENT
Start: 2018-02-13 | End: 2018-02-13 | Stop reason: HOSPADM

## 2018-02-12 RX ORDER — LIDOCAINE HYDROCHLORIDE 20 MG/ML
INJECTION, SOLUTION EPIDURAL; INFILTRATION; INTRACAUDAL; PERINEURAL AS NEEDED
Status: DISCONTINUED | OUTPATIENT
Start: 2018-02-12 | End: 2018-02-12 | Stop reason: HOSPADM

## 2018-02-12 RX ORDER — GLYCOPYRROLATE 0.2 MG/ML
INJECTION INTRAMUSCULAR; INTRAVENOUS AS NEEDED
Status: DISCONTINUED | OUTPATIENT
Start: 2018-02-12 | End: 2018-02-12 | Stop reason: HOSPADM

## 2018-02-12 RX ORDER — ASPIRIN 325 MG
325 TABLET, DELAYED RELEASE (ENTERIC COATED) ORAL 2 TIMES DAILY
Status: DISCONTINUED | OUTPATIENT
Start: 2018-02-12 | End: 2018-02-13 | Stop reason: HOSPADM

## 2018-02-12 RX ORDER — ONDANSETRON 2 MG/ML
4 INJECTION INTRAMUSCULAR; INTRAVENOUS
Status: ACTIVE | OUTPATIENT
Start: 2018-02-12 | End: 2018-02-13

## 2018-02-12 RX ORDER — MIDAZOLAM HYDROCHLORIDE 1 MG/ML
INJECTION, SOLUTION INTRAMUSCULAR; INTRAVENOUS AS NEEDED
Status: DISCONTINUED | OUTPATIENT
Start: 2018-02-12 | End: 2018-02-12 | Stop reason: HOSPADM

## 2018-02-12 RX ORDER — OXYCODONE HYDROCHLORIDE 5 MG/1
5 TABLET ORAL
Status: DISCONTINUED | OUTPATIENT
Start: 2018-02-12 | End: 2018-02-12 | Stop reason: HOSPADM

## 2018-02-12 RX ORDER — CEFAZOLIN SODIUM/WATER 2 G/20 ML
2 SYRINGE (ML) INTRAVENOUS EVERY 8 HOURS
Status: COMPLETED | OUTPATIENT
Start: 2018-02-12 | End: 2018-02-13

## 2018-02-12 RX ORDER — SODIUM CHLORIDE 0.9 % (FLUSH) 0.9 %
5-10 SYRINGE (ML) INJECTION EVERY 8 HOURS
Status: DISCONTINUED | OUTPATIENT
Start: 2018-02-12 | End: 2018-02-13 | Stop reason: HOSPADM

## 2018-02-12 RX ADMIN — ACETAMINOPHEN 650 MG: 325 TABLET ORAL at 23:04

## 2018-02-12 RX ADMIN — LIDOCAINE HYDROCHLORIDE 100 MG: 20 INJECTION, SOLUTION EPIDURAL; INFILTRATION; INTRACAUDAL; PERINEURAL at 07:35

## 2018-02-12 RX ADMIN — MIDAZOLAM HYDROCHLORIDE 2 MG: 1 INJECTION, SOLUTION INTRAMUSCULAR; INTRAVENOUS at 07:26

## 2018-02-12 RX ADMIN — ONDANSETRON 4 MG: 2 INJECTION INTRAMUSCULAR; INTRAVENOUS at 07:58

## 2018-02-12 RX ADMIN — MIDAZOLAM HYDROCHLORIDE 1 MG: 1 INJECTION, SOLUTION INTRAMUSCULAR; INTRAVENOUS at 07:03

## 2018-02-12 RX ADMIN — PROPOFOL 110 MG: 10 INJECTION, EMULSION INTRAVENOUS at 07:35

## 2018-02-12 RX ADMIN — SODIUM CHLORIDE, POTASSIUM CHLORIDE, SODIUM LACTATE AND CALCIUM CHLORIDE: 600; 310; 30; 20 INJECTION, SOLUTION INTRAVENOUS at 08:54

## 2018-02-12 RX ADMIN — EPHEDRINE SULFATE 10 MG: 50 INJECTION, SOLUTION INTRAVENOUS at 07:57

## 2018-02-12 RX ADMIN — Medication 2 G: at 23:04

## 2018-02-12 RX ADMIN — DOCUSATE SODIUM AND SENNOSIDES 1 TABLET: 8.6; 5 TABLET, FILM COATED ORAL at 17:17

## 2018-02-12 RX ADMIN — ROCURONIUM BROMIDE 20 MG: 10 INJECTION, SOLUTION INTRAVENOUS at 08:01

## 2018-02-12 RX ADMIN — Medication 2 G: at 15:32

## 2018-02-12 RX ADMIN — ASPIRIN 325 MG: 325 TABLET, DELAYED RELEASE ORAL at 12:48

## 2018-02-12 RX ADMIN — FAMOTIDINE 20 MG: 20 TABLET, FILM COATED ORAL at 12:47

## 2018-02-12 RX ADMIN — SODIUM CHLORIDE, POTASSIUM CHLORIDE, SODIUM LACTATE AND CALCIUM CHLORIDE 125 ML/HR: 600; 310; 30; 20 INJECTION, SOLUTION INTRAVENOUS at 06:28

## 2018-02-12 RX ADMIN — ACETAMINOPHEN 975 MG: 325 TABLET ORAL at 06:28

## 2018-02-12 RX ADMIN — EPHEDRINE SULFATE 10 MG: 50 INJECTION, SOLUTION INTRAVENOUS at 07:48

## 2018-02-12 RX ADMIN — Medication 10 ML: at 20:10

## 2018-02-12 RX ADMIN — NEOSTIGMINE METHYLSULFATE 2 MG: 1 INJECTION INTRAVENOUS at 08:52

## 2018-02-12 RX ADMIN — ACETAMINOPHEN 650 MG: 325 TABLET ORAL at 12:47

## 2018-02-12 RX ADMIN — MIDAZOLAM HYDROCHLORIDE 2 MG: 1 INJECTION, SOLUTION INTRAMUSCULAR; INTRAVENOUS at 07:01

## 2018-02-12 RX ADMIN — FENTANYL CITRATE 50 MCG: 50 INJECTION, SOLUTION INTRAMUSCULAR; INTRAVENOUS at 07:01

## 2018-02-12 RX ADMIN — GLYCOPYRROLATE 0.3 MG: 0.2 INJECTION INTRAMUSCULAR; INTRAVENOUS at 08:52

## 2018-02-12 RX ADMIN — Medication 10 ML: at 15:32

## 2018-02-12 RX ADMIN — CELECOXIB 100 MG: 100 CAPSULE ORAL at 06:29

## 2018-02-12 RX ADMIN — EPHEDRINE SULFATE 10 MG: 50 INJECTION, SOLUTION INTRAVENOUS at 08:37

## 2018-02-12 RX ADMIN — FAMOTIDINE 20 MG: 20 TABLET, FILM COATED ORAL at 17:16

## 2018-02-12 RX ADMIN — BUPIVACAINE HYDROCHLORIDE 6 ML/HR: 2.5 INJECTION, SOLUTION EPIDURAL; INFILTRATION; INTRACAUDAL; PERINEURAL at 09:23

## 2018-02-12 RX ADMIN — ROPIVACAINE HYDROCHLORIDE 30 ML: 5 INJECTION, SOLUTION EPIDURAL; INFILTRATION; PERINEURAL at 07:10

## 2018-02-12 RX ADMIN — DOCUSATE SODIUM AND SENNOSIDES 1 TABLET: 8.6; 5 TABLET, FILM COATED ORAL at 12:47

## 2018-02-12 RX ADMIN — SUCCINYLCHOLINE CHLORIDE 100 MG: 20 INJECTION INTRAMUSCULAR; INTRAVENOUS at 07:35

## 2018-02-12 RX ADMIN — EPHEDRINE SULFATE 10 MG: 50 INJECTION, SOLUTION INTRAVENOUS at 08:10

## 2018-02-12 RX ADMIN — ACETAMINOPHEN 650 MG: 325 TABLET ORAL at 17:16

## 2018-02-12 RX ADMIN — SODIUM CHLORIDE 125 ML/HR: 900 INJECTION, SOLUTION INTRAVENOUS at 09:32

## 2018-02-12 RX ADMIN — POLYETHYLENE GLYCOL (3350) 17 G: 17 POWDER, FOR SOLUTION ORAL at 12:48

## 2018-02-12 RX ADMIN — FENTANYL CITRATE 50 MCG: 50 INJECTION, SOLUTION INTRAMUSCULAR; INTRAVENOUS at 07:26

## 2018-02-12 RX ADMIN — DEXAMETHASONE SODIUM PHOSPHATE 4 MG: 4 INJECTION, SOLUTION INTRA-ARTICULAR; INTRALESIONAL; INTRAMUSCULAR; INTRAVENOUS; SOFT TISSUE at 07:58

## 2018-02-12 RX ADMIN — Medication 2 G: at 07:43

## 2018-02-12 RX ADMIN — ASPIRIN 325 MG: 325 TABLET, DELAYED RELEASE ORAL at 17:16

## 2018-02-12 NOTE — CONSULTS
[address]   Initial Consult Note    Patient:  Veronica Bai. MRN:  [id number]  YOB: 1940  Age:  70 y.o. Primary care provider:  Evelyn Morfin MD    Date of admission:  2/12/2018    Date of consultation:  2/12/2018    Requesting physician:  Dr. Blu Kaba    Reason for consultation:  Medical management                              History of present illness  Veronica Tim is a 70 y.o. male with OA, HTN, HLD who is POD #0 s/p left total shoulder arthroplasty. The Family Medicine Service was consulted for medical management. He is doing well post-operatively-pain well controlled, denies nausea/vomiting, chest pain, sob, abdominal pain. Not yet passing flatus and no BM. No complaints. Home Medications   Prior to Admission medications    Medication Sig Start Date End Date Taking? Authorizing Provider   simvastatin (ZOCOR) 10 mg tablet Take 10 mg by mouth nightly. Yes Historical Provider   VIT A/VIT C/VIT E/ZINC/COPPER (PRESERVISION AREDS PO) Take 1 Tab by mouth two (2) times a day. Yes Historical Provider   valsartan (DIOVAN) 320 mg tablet Take 320 mg by mouth every evening. Yes Historical Provider   amLODIPine (NORVASC) 5 mg tablet Take 5 mg by mouth every morning. Yes Historical Provider   mupirocin calcium (BACTROBAN NASAL) 2 % nasal ointment by Both Nostrils route two (2) times a day. Historical Provider   aspirin delayed-release 81 mg tablet Take 81 mg by mouth every evening.     Historical Provider       Allergies   No Known Allergies    Past Medical History   Past Medical History:   Diagnosis Date    Arthritis     Cataracts, bilateral     Chronic pain     back    Hypercholesteremia     Hypertension     Macular degeneration     Other ill-defined conditions(799.89) 1966    numerous blood transfusions - raymundo nam- injury to right leg    Other ill-defined conditions(799.89) 2011    4 units PRBCs in [place] for knee revision  Unspecified adverse effect of anesthesia 1966    convulsion post op leg surgery x 1       Past Surgical History  Past Surgical History:   Procedure Laterality Date    HX ADENOIDECTOMY      HX ORTHOPAEDIC  1966    right leg-x 10 surgeries raymundo nam injury    HX ORTHOPAEDIC  1997    right knee replacement    HX ORTHOPAEDIC  2011, 2017    right knee revision    HX ORTHOPAEDIC  01/2015    left knee arthroscopy    HX ORTHOPAEDIC  11/2015    left knee replacement    HX ORTHOPAEDIC  03/2015    left elbow and carpal tunnel    HX OTHER SURGICAL  2004    normal cardiac cath       Family History  Family History   Problem Relation Age of Onset    Cancer Mother     Other Mother      polio in infancy    Hypertension Father     Heart Disease Father     No Known Problems Sister     No Known Problems Brother     Cancer Sister      panceratic    No Known Problems Sister      Social History   Patient resides  X  Independently      With family care      Assisted living      SNF      Ambulates  X  Independently      With cane       Assisted walker           Alcohol history     None   X  Social     Chronic     Smoking history  X  None     Former smoker     Current smoker     History   Smoking Status    Never Smoker   Smokeless Tobacco    Never Used         Illicit drug history  X  None     Former user     Current user         Review of Systems  See HPI    Physical Exam  Visit Vitals    /65 (BP 1 Location: Right arm, BP Patient Position: At rest)    Pulse 78    Temp 97.4 °F (36.3 °C)    Resp 16    SpO2 93%        General: No acute distress. Alert. Cooperative. Head: Normocephalic. Atraumatic. Neck: Supple. Normal ROM. Respiratory: CTAB. No w/r/r/c.   Cardiovascular: RRR. Normal S1,S2. No m/r/g. Pulses 2+ throughout. GI: + bowel sounds. Nontender. No rebound tenderness or guarding. Nondistended. Extremities: No edema. No palpable cord. No tenderness. LUE in sling.     Musculoskeletal: Full ROM in all extremities except for LUE. Neuro: CN II-XII grossly intact. Strength 5/5 in all extremities. Sensation intact in all extremities. DTRs 2+ throughout. Skin: Clear. No rashes. No ulcers. Laboratory Data  No results found for this or any previous visit (from the past 24 hour(s)). Impression/Recomendations   Gene Pedersen is a 70 y.o. male with OA, HTN, HLD who is POD #0 s/p left total shoulder arthroplasty. The Family Medicine Service was consulted for medical management. OA: POD #0 s/p left total shoulder athroplasty  -Management per primary team     HTN: BP stable post-operatively.   -Continue Norvasc 5mg and Diovan 320mg daily (pt supplied)    HLD: No lipid panel on file  -Continue Zocor 10mg daily     FEN/GI - CLD, NS at 125mL/hr per primary team   Activity - Out of bed with assistance  DVT prophylaxis - SCD  GI prophylaxis -  Pepcid   Disposition - Plan to d/c to be determined by primary team        Thank you very much for allowing us to participate in the care of this pleasant patient. The family medicine service will continue to follow the patient's medical progress along with you. Please do not hesitate to page with any questions or to discuss the case (pager# [phone]). Patient discussed with Dr. Sharlene Spears by:     Zoë Corral, [facility] Problems  Date Reviewed: 3/18/2015          Codes Class Noted POA    Osteoarthritis of left shoulder ICD-10-CM: M19.012  ICD-9-CM: 715.91  2/12/2018 Unknown                [address] Medicine Residency Attending Addendum:  I saw and evaluated the patient, performing the key elements of the service. I discussed the findings, assessment and plan with the resident and agree with the resident's findings and plan as documented in the resident's note. The patient was seen on 312/18  70year old pleasant gentleman, s/p left total arthroplasty.   We are consulted for management of comorbidities.   He reports no complaints    Vitals:    02/12/18 2352 02/13/18 0046 02/13/18 0416 02/13/18 0821   BP: 150/83 147/71 149/84 141/82   Pulse: 72  77 80   Resp: 17 18 18   Temp: 98.6 °F (37 °C)   99.2 °F (37.3 °C)   SpO2: 96%  96% 95%     NAD  Lungs-clear      Assessment/Plan:   S/p LTSA  Continue norvasc/diovan postoperatively  Encouraged use of IS    Hospital Problems  Date Reviewed: 3/18/2015          Codes Class Noted POA    * (Principal)Osteoarthritis of left shoulder ICD-10-CM: A53.236  ICD-9-CM: 715.91  2/12/2018 Unknown

## 2018-02-12 NOTE — ANESTHESIA PREPROCEDURE EVALUATION
Anesthetic History          Comments: Convulsions after surgery in 1960     Review of Systems / Medical History  Patient summary reviewed, nursing notes reviewed and pertinent labs reviewed    Pulmonary  Within defined limits                 Neuro/Psych   Within defined limits           Cardiovascular    Hypertension: well controlled              Exercise tolerance: >4 METS  Comments: Neg cardiac cath 2004   GI/Hepatic/Renal  Within defined limits              Endo/Other        Arthritis     Other Findings   Comments: HLD, chronic back pain, macular degeneration           Physical Exam    Airway  Mallampati: I  TM Distance: 4 - 6 cm  Neck ROM: normal range of motion   Mouth opening: Normal     Cardiovascular    Rhythm: regular  Rate: normal         Dental    Dentition: Upper dentition intact and Lower dentition intact     Pulmonary  Breath sounds clear to auscultation               Abdominal         Other Findings            Anesthetic Plan    ASA: 2  Anesthesia type: general      Post-op pain plan if not by surgeon: peripheral nerve block continuous    Induction: Intravenous  Anesthetic plan and risks discussed with: Patient

## 2018-02-12 NOTE — PROGRESS NOTES
Consult to family practice called. Assigned to Dr. Rommel Mujica. Informed Dr. Navarrete Shallow that the patient and his wife are requesting to use his home pills (has pharmacy bottles) for his simvastatin, amplodipine and valsartan.

## 2018-02-12 NOTE — IP AVS SNAPSHOT
[address] [facility] 
 
 
 [address] 
[phone] Patient: Supriya Varghese. MRN: [id number] VK A check citlali indicates which time of day the medication should be taken. My Medications START taking these medications Instructions Each Dose to Equal  
 Morning Noon Evening Bedtime  
 aspirin 325 mg tablet Commonly known as:  ASPIRIN Replaces:  aspirin delayed-release 81 mg tablet Your last dose was:  morning Your next dose is:   evening Take 1 Tab by mouth every twelve (12) hours for 14 days. Take with food 325 mg HYDROcodone-acetaminophen 5-325 mg per tablet Commonly known as:  Mac Weller Take 1-2 Tabs by mouth every four (4) hours as needed for Pain. Max Daily Amount: 12 Tabs. 1-2 Tab CONTINUE taking these medications Instructions Each Dose to Equal  
 Morning Noon Evening Bedtime  
 amLODIPine 5 mg tablet Commonly known as:  Linda Crews Your last dose was:  morning Your next dose is:  morning Take 5 mg by mouth every morning. 5 mg BACTROBAN NASAL 2 % nasal ointment Generic drug:  mupirocin calcium Your last dose was:  Not given in hospital  
Your next dose is:  Finish ordered course  
   
 by Both Nostrils route two (2) times a day. PRESERVISION AREDS PO Your last dose was:  Not given in hospital  
Your next dose is:  Resume home routine Take 1 Tab by mouth two (2) times a day. 1 Tab  
    
   
   
   
  
 simvastatin 10 mg tablet Commonly known as:  ZOCOR Your last dose was:  Not given in hospital  
Your next dose is:  Resume home routine Take 10 mg by mouth nightly. 10 mg  
    
   
   
   
  
 valsartan 320 mg tablet Commonly known as:  DIOVAN Your last dose was:  early morning Your next dose is:  Tuesday 2/13 bedtime Take 320 mg by mouth every evening. 320 mg  
    
   
   
   
  
  
STOP taking these medications   
 aspirin delayed-release 81 mg tablet Replaced by:  aspirin 325 mg tablet Where to Get Your Medications Information on where to get these meds will be given to you by the nurse or doctor. ! Ask your nurse or doctor about these medications  
  aspirin 325 mg tablet HYDROcodone-acetaminophen 5-325 mg per tablet

## 2018-02-12 NOTE — PROGRESS NOTES
Bedside and Verbal shift change report given to Tavares Plaza RN (oncoming nurse) by Tamie Solis RN (offgoing nurse). Report included the following information SBAR, Kardex, Procedure Summary, Intake/Output, MAR and Recent Results.

## 2018-02-12 NOTE — OP NOTES
Date of Procedure: 2/12/2018   Preoperative Diagnosis: LEFT SHOUDLER OA  Postoperative Diagnosis: LEFT SHOUDLER OA , BICEPS TENDONITIS   Procedure: Procedure(s):  LEFT TOTAL SHOULDER ARTHROPLASTY (GEN WITH BLOCK), WITH BICEPS TENDONESIS AND AXILLARY NERVE DISSECTION , BICEPS TENODESIS, AXILLARY NERVE DISSECTION  Surgeon: Boaz Velez MD  Assistant(s): Raina Wolf PA-C, ATC  Anesthesia: General   Estimated Blood Loss: 100cc  Specimens: * No specimens in log *   Complications: None  Implants:  Implant Name Type Inv. Item Serial No.  Lot No. LRB No. Used Action   CEMENT BNE SIMPLEX W/GENT -- 10/PK - [id number]  CEMENT BNE SIMPLEX W/GENT -- 10/PK [id number] [employer] [facility] [id number] Left 1 Implanted   SUTURE BUTTON   [id number]  [id number] Left 1 Implanted   POST VANDANA HYBRID PT REGENEREX -- COMPREHENSIVE - [id number]  POST VANDANA HYBRID PT REGENEREX -- COMPREHENSIVE [id number] [employer] [id number] Left 1 Implanted   BASEPLT VANDANA HYBRID LG 4MM --  - [id number]  BASEPLT VANDANA HYBRID LG 4MM --  [id number] [employer] [id number] Left 1 Implanted   HEAD HUM VERSA-DIAL STD TAPR --  - [id number]  HEAD HUM VERSA-DIAL STD TAPR --  [id number] [employer] [id number] Left 1 Implanted   HEAD HUM BPLR [id number] -- VERSA-DIAL - [id number]  HEAD HUM BPLR [id number] -- VERSA-DIAL [id number] [employer] [id number] Left 1 Implanted   STEM HUM LEONA MINI 15MM -- COMPREHENSIVE - [id number]   STEM HUM LEONA MINI 15MM -- COMPREHENSIVE [id number] [employer] [id number] Left 1 Implanted         INDICATIONS:  The patient is a patient of mine who has had a long history of left shoulder pain. After failure of  conservative treatment, the patient presents for definitive operative care.     DESCRIPTION OF PROCEDURE:  After being informed of the risks and benefits, which include but are not limited to bleeding, infection, neurovascular damage, wound complications, pain and stiffness in the shoulder, periprosthetic loosening, fracture and dislocation the patient consented for the procedure. After adequate general anesthesia, sg shoulder was prepped and draped in a sterile fashion. A standard deltopectoral incision was then made, and the cephalic vein was identified and retracted. The subdeltoid and subcoracoid spaces were identified and retracted. The bicipital sheath was then incised, and the biceps tendon was identified and noted to be fairly frayed. We proceeded with a biceps tenodesis. We used #2FiberWire and tenodesed the tendon down to the pectoralis major tendon. The bicipital sheath was then incised and carried proximally through the rotator interval. The rotator interval was then incised to the base of the coracoid. The lesser tuberosity was then drilled, and the lesser tuberosity osteotomy was then performed using an osteotome. We externally rotated the arm, and the anterior-inferior capsule was then debrided off of the neck, allowing the humeral head to dislocate. The anatomic neck was then marked with a bovie then cut with an oscillating saw. We then serially reamed and broached the humerus. This was done to the appropriate size, obtaining excellent chatter and fit of the proximal humerus. At that point we used a calcar planer to finalize the version and the height of the humeral cut. Care was taken to be sure that the supraspinatus fibers inserted directly into the level of the resection, and no excess bone was present. The glenoid was then addressed. A laminar  was inserted, and the axillary nerve was identified as a coursed off of the posterior cord of the brachial plexus. The nerve was followed along the anterior-inferior capsule as it coursed into the posterior subdeltoid space. The nerve was then protected, and then the anterior-inferior capsule was then incised carefully. A complete release was performed all of the way through the posterior capsule. Retractors were placed, and the labrum was excised.   Care was taken to remove any glenoid osteophytes with a bur and rongeur. We marked the glenoid, and then the center of the glenoid was drilled. We serially reamed the glenoid, taking care to compensate for any amount of retroversion as identified in the preoperative CT scan. Once the reaming was complete the excess bone was then removed with a rongeur and a bur, and the peripheral peg holes were then drilled. The central large peg was then drilled as well. The glenoid was then irrigated copiously, and hemostasis was obtained with thrombin and epinephrine. The appropriately sized glenoid was cemented into position using 3rd generation cementing techniques. We used a Donte syringe to pressurize the cement within the peg holes. All excess cement was debrided, and pressure was held on the implant until the cement was allowed to dry. We then serially trialed the humeral head. Care was taken to obtain the full 180 degrees of forward elevation, 60 degrees of internal rotation in the abducted position, 45 degrees of external rotation in the neutral position with the subscapularis reduced, and 50% subluxation posteriorly with spontaneous reduction. After this was obtained the trials were then removed. A #5 FiberWire was placed through the medial aspect of the osteotomy site, and the appropriate stem was inserted into the humerus. Care was taken to be sure that this medial suture was passed around the stem. We then repaired the lesser tuberosity osteotomy using the backpack technique. We tied the sutures over a button laterally to prevent pullout. The medial sutures were passed through the osteotendinous junction in a mattress fashion and tied down as well. The lateral rotator interval was closed with #2 FiberWire, and the wound was then irrigated copiously with antibiotic irrigation. The deltopectoral interval was then closed. The skin was closed in layers.  Sterile dressings were applied and the patient was awakened, taken to the recovery room in stable condition. There were no complications.

## 2018-02-12 NOTE — PROGRESS NOTES
02/12/18 12:44 PM  CM met with patient and his wife Mily Baarjas ([phone]) to complete initial assessment and to begin discharge planning. Demographics were reviewed and confirmed. Patient lives in a one level home with 4 entry steps with his wife. PTA, he drove and was independent with all ADLs. Has used [facility] in the past with [facility], in August 2017. DME includes RW and cane. Has rx coverage and fills scripts at [facility] in [place]. Has outpatient PT arranged at [facility] office for Thursday 2/15 at 7:30 AM; added to AVS.  Wife will provide transportation home at discharge and to follow up appointments. Care Management Interventions  PCP Verified by CM:  Yes  Mode of Transport at Discharge: Self  Transition of Care Consult (CM Consult): Discharge Planning  Physical Therapy Consult: Yes  Occupational Therapy Consult: Yes  Speech Therapy Consult: No  Current Support Network: Lives with Spouse  Confirm Follow Up Transport: Family  Plan discussed with Pt/Family/Caregiver: Yes  Discharge Location  Discharge Placement: Home with outpatient services  KELSI Rudd

## 2018-02-12 NOTE — IP AVS SNAPSHOT
[address] [facility] 
 
 
 [address] 
[phone] Patient: Veronica Bai. MRN: [id number] About your hospitalization You were admitted on:  February 12, 2018 You last received care in the:  [facility] 4M POST SURG ORT 1 You were discharged on:  February 13, 2018 Why you were hospitalized Your primary diagnosis was:  Osteoarthritis Of Left Shoulder Follow-up Information Follow up With Details Comments Contact Info [place] On 2/15/2018 730 am out patient PT [address] [facility] [address] [phone] Evelyn Morfin MD   [address] [address] [facility] [id number] [phone] Discharge Orders None A check citlali indicates which time of day the medication should be taken. My Medications START taking these medications Instructions Each Dose to Equal  
 Morning Noon Evening Bedtime  
 aspirin 325 mg tablet Commonly known as:  ASPIRIN Replaces:  aspirin delayed-release 81 mg tablet Your last dose was: Tuesday 2/13 morning Your next dose is:  Tuesday 2/13 evening Take 1 Tab by mouth every twelve (12) hours for 14 days. Take with food 325 mg HYDROcodone-acetaminophen 5-325 mg per tablet Commonly known as:  Guadlupe Elms Take 1-2 Tabs by mouth every four (4) hours as needed for Pain. Max Daily Amount: 12 Tabs. 1-2 Tab CONTINUE taking these medications Instructions Each Dose to Equal  
 Morning Noon Evening Bedtime  
 amLODIPine 5 mg tablet Commonly known as:  Khushbu Moody Your last dose was: Tuesday 2/13 morning Your next dose is: Wednesday 2/14 morning Take 5 mg by mouth every morning. 5 mg BACTROBAN NASAL 2 % nasal ointment Generic drug:  mupirocin calcium Your last dose was:  Not given in hospital  
 Your next dose is:  Finish ordered course  
   
 by Both Nostrils route two (2) times a day. PRESERVISION AREDS PO Your last dose was:  Not given in hospital  
Your next dose is:  Resume home routine Take 1 Tab by mouth two (2) times a day. 1 Tab  
    
   
   
   
  
 simvastatin 10 mg tablet Commonly known as:  ZOCOR Your last dose was:  Not given in hospital  
Your next dose is:  Resume home routine Take 10 mg by mouth nightly. 10 mg  
    
   
   
   
  
 valsartan 320 mg tablet Commonly known as:  DIOVAN Your last dose was: Tuesday 2/13 early morning Your next dose is:  Tuesday 2/13 bedtime Take 320 mg by mouth every evening. 320 mg  
    
   
   
   
  
  
STOP taking these medications   
 aspirin delayed-release 81 mg tablet Replaced by:  aspirin 325 mg tablet Where to Get Your Medications Information on where to get these meds will be given to you by the nurse or doctor. ! Ask your nurse or doctor about these medications  
  aspirin 325 mg tablet HYDROcodone-acetaminophen 5-325 mg per tablet Discharge Instructions Shoulder Replacement Surgery Discharge Instructions Dr. Brandon Gaytan Please take the time to review the following instructions before you leave the hospital and use them as guidelines during your recovery from surgery. If you have any questions, you may contact my office at [phone]. Yen Dee / Kamari Lewis Do NOT remove your dressing. Keep the clear, plastic dressing intact until your follow up in the office. Gary Sarkar / Juany Tyson If the clear plastic dressing is intact and there is no drainage, you may shower. If the dressing is loose or water gets under it please notify our office. If there is drainage, please call the office immediately. Alis Keep your arm in the immobilizer/sling at all times except when showering, changing your clothes, and doing the exercises shown to you by Dr. Sharif Partida or your physical/occupational therapist prior to your discharge from the hospital.  Keep your arm at your side when changing your clothes and showering. Do not move it away from your body. Ice and Elevation Ice therapy should be used consistently for 48 hours after surgery. Subsequently, you should ice 3 times per day for 20 minutes at a time. After the first week, you may use ice as needed for pain and swelling. Please ensure there is protective barrier between your skin and the ice. Diet You may advance your regular diet as tolerated. Increase your clear liquid intake for the next 2-3 days. Medications Your prescriptions were sent to the pharmacy on file. We are unable to change the  narcotic prescription that has already been sent today. If you would like to change your pharmacy for FUTURE prescriptions, please call the office. 1. Pain: You were prescribed a narcotic medication for pain control. Please  use the medication as prescribed. Pain medications may cause constipation  Colace or Milk of Magnesia may be used as needed. Other possible side effects of pain medications are dizziness, headache, nausea, vomiting, and urinary retention. Discontinue the pain medication if you develop itching, rash, shortness of breath, or difficulties swallowing. If these symptoms become severe or arent relieved by discontinuing the medication, you should seek immediate medical attention. You cannot drive or operate machinery while taking narcotic medication. Some pain medications already contain Tylenol/Acetaminophen. Please read your prescription pain medicine bottle prior to taking additional Tylenol. Do not exceed 3000mg of Tylenol/Acetaminophen in a 24 hour period. Refills of pain medication are authorized during office hours only ( Monday to Friday 8am-5pm) 2. Blood Thinner:  You have been given a prescription for Aspirin 325mg. Please take one tablet twice daily for 14 days. Do not take anti-inflammatory medication (Advil, Aleve, Motrin) while taking the blood thinner. 3. Stool Softeners:  Pain medications can cause constipation. Stool softeners, warm prune juice and increasing your water and fiber intake can help prevent constipation. Do not take laxatives. 4. You should resume other medications you were taking prior to your surgery. Pain medication may change the effects of any antidepressant medication you are taking. If you have any questions about possible interactions between your regular medications and the pain medication you should consult the physician who prescribes your regular medications. Physical Therapy: You must begin outpatient physical therapy 2-3 days after your surgery. Your were given a prescription when you scheduled your surgery. If you do not have an appointment scheduled or a therapy prescription please call Dr. Jonah Harley' office immediately. APPOINTMENT:  Ralph Gonzalez 2-15 at 7:30 Follow-Up Appointment: 
Please follow up at your scheduled appointment 10-14 days from the day of your surgery. If you do not already have an appointment, please call our office at [phone] for your follow up appointment. Your appointment will likely be with Bakari Farrell PA-C. Maxx Kaba assists Dr. Jonah Harley in surgery and will be able to review your operation and answer your questions. APPOINTMENT: 2-26 at 1:15pm  
 
 
Important Signs and Symptoms: 
If any of the following signs and symptoms occurs, you should contact Dr. Tello Senior office.  Please be advised if a problem arises which you feel requires immediate medical attention or you are unable to contact  [facility]  office, you should seek immediate medical attention. Signs and symptoms to watch for include: 1. A sudden increase in swelling and/or redness or warmth at the area of your surgery which isnt relieved by rest, ice, and elevation. 2. Oral temperature greater than 101degrees for 12 hours or more which isnt relieved by an increase in fluid intake and taking two Tylenol every 4-6 hours. 3. Excessive drainage from your incisions, or drainage which hasnt stopped by 72 hours after your surgery. 4. Calf pain, ever, chills, shortness of breath, chest pain, nausea, vomiting or other signs and symptoms which are of concern to you. Unless you are having a true medical emergency,  
you MUST call Dr. Teresa Griffin' office BEFORE proceeding to the Emergency Department. A provider is on call 24 hours/day. Exercises after Shoulder Surgery 1. Passive Forward Flexion: 
                          
Instructions: 
? Lay on your back ? Take your non surgical arm and grab the wrist of your surgical arm ? Use your non surgical arm to lift your surgical arm over your head with the elbow straight ? Slowly return your arm to your side using your non surgical arm ? Repeat 20 times in the morning and 20 times in the evening Remember: 
- Passive motion: Your arm is lifted with the help of your other hand. o The repair is protected when you do passive motion - Active motion: You lift your arm by using your shoulder muscles. o DO NOT DO ANY ACTIVE MOTION FOR NOW 2. Codman Pendulum Exercises Instructions: 
? Bend forward about 90° at the waist and support yourself on a sturdy object with your non surgical arm  (bend slightly at the knees to protect your back) ? Gently allow your surgical arm to hang towards the ground ? Move your hips forward and backward allowing your arm to swing slightly ? Arm can move forward, backward, and in small circles (clockwise and counterclockwise) o Remember: let your body move your arm, do not use your arm muscles ? Begin performing the exercise for about 30 seconds. Progress to 2 minutes. ? Repeat 2-3 times per day SetMeUp Announcement We are excited to announce that we are making your provider's discharge notes available to you in SetMeUp. You will see these notes when they are completed and signed by the physician that discharged you from your recent hospital stay. If you have any questions or concerns about any information you see in SetMeUp, please call the Health Information Department where you were seen or reach out to your Primary Care Provider for more information about your plan of care. Introducing [facility]! [facility] introduces SetMeUp patient portal. Now you can access parts of your medical record, email your doctor's office, and request medication refills online. 1. In your internet browser, go to [email or web address]. [email or web address] 2. Click on the First Time User? Click Here link in the Sign In box. You will see the New Member Sign Up page. 3. Enter your SetMeUp Access Code exactly as it appears below. You will not need to use this code after youve completed the sign-up process. If you do not sign up before the expiration date, you must request a new code. · SetMeUp Access Code: [email or web address] Expires: 3/15/2018 12:17 PM 
 
4. Enter the last four digits of your Social Security Number (xxxx) and Date of Birth (mm/dd/yyyy) as indicated and click Submit. You will be taken to the next sign-up page. 5. Create a [employer] ID. This will be your SetMeUp login ID and cannot be changed, so think of one that is secure and easy to remember. 6. Create a SetMeUp password. You can change your password at any time. 7. Enter your Password Reset Question and Answer.  This can be used at a later time if you forget your password. 8. Enter your e-mail address. You will receive e-mail notification when new information is available in [address] 9. Click Sign Up. You can now view and download portions of your medical record. 10. Click the Download Summary menu link to download a portable copy of your medical information. If you have questions, please visit the Frequently Asked Questions section of the [employer] website. Remember, [employer] Support is NOT to be used for urgent needs. For medical emergencies, dial 911. Now available from your iPhone and Android! Providers Seen During Your Hospitalization Provider Specialty Primary office phone Cynthia Galeano MD Orthopedic Surgery [phone] Your Primary Care Physician (PCP) Primary Care Physician Office Phone Office Fax Eamon AHUMADA [phone] You are allergic to the following No active allergies Recent Documentation Smoking Status Never Smoker Emergency Contacts Name Discharge Info Relation Home Work Mobile Debbi Enrique DISCHARGE CAREGIVER [3] Spouse [3] [phone] Patient Belongings The following personal items are in your possession at time of discharge: 
  Dental Appliances: None  Visual Aid: Glasses, With patient   Hearing Aids/Status: At home, Bilateral  Home Medications: None   Jewelry: None  Clothing: Other (comment) (street clothes to Pacu)    Other Valuables: Eyeglasses (to wife) Please provide this summary of care documentation to your next provider. Signatures-by signing, you are acknowledging that this After Visit Summary has been reviewed with you and you have received a copy. Patient Signature:  ____________________________________________________________ Date:  ____________________________________________________________  
  
Johny Jay  Provider Signature: ____________________________________________________________ Date:  ____________________________________________________________

## 2018-02-12 NOTE — H&P (VIEW-ONLY)
PAT Pre-Op History & Physical    Patient: Raymond Jonas. MRN: [id number]          SSN: [id number]  YOB: 1940          Age: 70 y.o. Sex: male                Subjective:   Patient is a 70 y.o.  male who presents with history of chronic left shoulder pain that began about 2 years ago and has escalated over time. Describes his left shoulder pain as an intermittent ache but can be sharp with movement. Rates his pain 3/10. States that his left shoulder pain makes it difficult to sleep at night. Ct of left UE done 12/22/2017 showed:    Severe left shoulder osteoarthritis. Mild volume loss of the glenoid. Large glenohumeral joint effusion. No aggressive bone lesion. Has failed steroid joint injections and NSAIDs. Patient is right hand dominant. The patient was evaluated in the surgeon's office and it was determined that the most appropriate plan of care is to proceed with surgical intervention.   Patient's PCP Kasia Akers III, MD            Past Medical History:   Diagnosis Date    Arthritis     Cataracts, bilateral     Chronic pain     back    Hypercholesteremia     Hypertension     Ill-defined condition     high cholesterol    Ill-defined condition     macular degeneration    Macular degeneration     Other ill-defined conditions(799.89) 1966    numerous blood transfusions - raymundo nam- injury to right leg    Other ill-defined conditions(799.89) 2011    4 units PRBCs in [place] for knee revision    Unspecified adverse effect of anesthesia 1966    convulsion post op leg surgery x 1    Unspecified adverse effect of anesthesia     no further convulsions with numerous subsequent surgeruies      Past Surgical History:   Procedure Laterality Date    HX ADENOIDECTOMY      HX ORTHOPAEDIC  1966    right leg-x 10 surgeries [facility] injury    HX ORTHOPAEDIC  1997    right knee replacement    HX ORTHOPAEDIC  2011, 2017    right knee revision    HX ORTHOPAEDIC  01/2015    left knee arthroscopy    HX ORTHOPAEDIC  11/2015    left knee replacement    HX ORTHOPAEDIC  03/2015    left elbow and carpal tunnel    HX OTHER SURGICAL  2004    normal cardiac cath      Prior to Admission medications    Medication Sig Start Date End Date Taking? Authorizing Provider   simvastatin (ZOCOR) 10 mg tablet Take 10 mg by mouth nightly. Yes Historical Provider   VIT A/VIT C/VIT E/ZINC/COPPER (PRESERVISION AREDS PO) Take 1 Tab by mouth two (2) times a day. Yes Historical Provider   valsartan (DIOVAN) 320 mg tablet Take 320 mg by mouth every evening. Yes Historical Provider   amLODIPine (NORVASC) 5 mg tablet Take 5 mg by mouth every morning. Yes Historical Provider   aspirin delayed-release 81 mg tablet Take 81 mg by mouth every evening. Historical Provider     Current Outpatient Prescriptions   Medication Sig    simvastatin (ZOCOR) 10 mg tablet Take 10 mg by mouth nightly.  VIT A/VIT C/VIT E/ZINC/COPPER (PRESERVISION AREDS PO) Take 1 Tab by mouth two (2) times a day.  valsartan (DIOVAN) 320 mg tablet Take 320 mg by mouth every evening.  amLODIPine (NORVASC) 5 mg tablet Take 5 mg by mouth every morning.  aspirin delayed-release 81 mg tablet Take 81 mg by mouth every evening. No current facility-administered medications for this encounter. No Known Allergies   Social History   Substance Use Topics    Smoking status: Never Smoker    Smokeless tobacco: Never Used    Alcohol use 2.5 oz/week     5 Glasses of wine per week      Comment:        History   Drug Use No     Family History   Problem Relation Age of Onset    Cancer Mother     Other Mother      polio in infancy    Hypertension Father     Heart Disease Father     No Known Problems Sister     No Known Problems Brother     Cancer Sister      panceratic    No Known Problems Sister          Review of Systems    Patient denies difficulty swallowing, mouth sores, or loose teeth.  Patient denies any recent dental procedures or any planned prior to surgery. Patient denies chest pain, tightness, pain radiating down left arm, palpitations. Denies dizziness, visual disturbances, or lightheadedness. Patient denies shortness of breath, wheezing, cough, fever, or chills. Patient denies diarrhea, constipation, or abdominal pain. Patient denies urinary problems including dysuria, hesitancy, urgency, or incontinence. Denies skin breakdown, rashes, insect bites or open area. C/o left shoulder pain and lower back pain, also pain right shin. Objective:   Patient Vitals for the past 24 hrs:   Temp Pulse Resp BP SpO2   18 0851 98.2 °F (36.8 °C) 80 16 156/75 97 %     Temp (24hrs), Av.2 °F (36.8 °C), Min:98.2 °F (36.8 °C), Max:98.2 °F (36.8 °C)    Body mass index is 26.44 kg/(m^2). Wt Readings from Last 1 Encounters:   18 86 kg (189 lb 9.5 oz)        Physical Exam:     General: Pleasant,  cooperative, no apparent distress, appears stated age. Eyes: Conjunctivae/corneas clear. EOMs intact. Nose: Nares normal.   Mouth/Throat: Lips, mucosa, and tongue normal. Teeth and gums normal.   Neck: Supple, symmetrical, trachea midline. Back: Symmetric   Lungs: Clear to auscultation bilaterally. Heart: Regular rate and rhythm, S1, S2 normal- + murmur - no click, gallop, or rub noted. Abdomen: Soft, non-tender. Bowel sounds normal. No distention. Musculoskeletal:  Left shoulder ROM limited by discomfort. Extremities:  Extremities normal, atraumatic, no cyanosis or edema. Calves                                 supple, non tender to palpation. Pulses: 2+ and symmetric bilateral upper extremities. Cap. refill <2 seconds   Skin: Skin color, texture, turgor normal.  No rashes or lesions. Neurologic: CN II-XII grossly intact. Alert and oriented x3.     Labs:   Recent Results (from the past 72 hour(s))   CBC WITH AUTOMATED DIFF    Collection Time: 18  9:48 AM   Result Value Ref Range WBC 5.3 4.1 - 11.1 K/uL    RBC 4.26 4.10 - 5.70 M/uL    HGB 12.3 12.1 - 17.0 g/dL    HCT 37.9 36.6 - 50.3 %    MCV 89.0 80.0 - 99.0 FL    MCH 28.9 26.0 - 34.0 PG    MCHC 32.5 30.0 - 36.5 g/dL    RDW 14.0 11.5 - 14.5 %    PLATELET 668 234 - 308 K/uL    MPV 9.1 8.9 - 12.9 FL    NRBC 0.0 0  WBC    ABSOLUTE NRBC 0.00 0.00 - 0.01 K/uL    NEUTROPHILS 62 32 - 75 %    LYMPHOCYTES 25 12 - 49 %    MONOCYTES 10 5 - 13 %    EOSINOPHILS 2 0 - 7 %    BASOPHILS 1 0 - 1 %    IMMATURE GRANULOCYTES 1 (H) 0.0 - 0.5 %    ABS. NEUTROPHILS 3.3 1.8 - 8.0 K/UL    ABS. LYMPHOCYTES 1.3 0.8 - 3.5 K/UL    ABS. MONOCYTES 0.5 0.0 - 1.0 K/UL    ABS. EOSINOPHILS 0.1 0.0 - 0.4 K/UL    ABS. BASOPHILS 0.0 0.0 - 0.1 K/UL    ABS. IMM. GRANS. 0.0 0.00 - 0.04 K/UL    DF AUTOMATED     METABOLIC PANEL, COMPREHENSIVE    Collection Time: 01/29/18  9:48 AM   Result Value Ref Range    Sodium 144 136 - 145 mmol/L    Potassium 4.2 3.5 - 5.1 mmol/L    Chloride 105 97 - 108 mmol/L    CO2 32 21 - 32 mmol/L    Anion gap 7 5 - 15 mmol/L    Glucose 94 65 - 100 mg/dL    BUN 23 (H) 6 - 20 MG/DL    Creatinine 0.94 0.70 - 1.30 MG/DL    BUN/Creatinine ratio 24 (H) 12 - 20      GFR est AA >60 >60 ml/min/1.73m2    GFR est non-AA >60 >60 ml/min/1.73m2    Calcium 8.9 8.5 - 10.1 MG/DL    Bilirubin, total 0.4 0.2 - 1.0 MG/DL    ALT (SGPT) 31 12 - 78 U/L    AST (SGOT) 19 15 - 37 U/L    Alk.  phosphatase 77 45 - 117 U/L    Protein, total 6.8 6.4 - 8.2 g/dL    Albumin 4.0 3.5 - 5.0 g/dL    Globulin 2.8 2.0 - 4.0 g/dL    A-G Ratio 1.4 1.1 - 2.2     HEMOGLOBIN A1C WITH EAG    Collection Time: 01/29/18  9:48 AM   Result Value Ref Range    Hemoglobin A1c 5.2 4.2 - 6.3 %    Est. average glucose 103 mg/dL   PROTHROMBIN TIME + INR    Collection Time: 01/29/18  9:48 AM   Result Value Ref Range    INR 1.0 0.9 - 1.1      Prothrombin time 10.3 9.0 - 11.1 sec   PTT    Collection Time: 01/29/18  9:48 AM   Result Value Ref Range    aPTT 29.4 22.1 - 32.5 sec    aPTT, therapeutic range 58.0 - 77.0 SECS   URINALYSIS W/ REFLEX CULTURE    Collection Time: 01/29/18  9:48 AM   Result Value Ref Range    Color YELLOW/STRAW      Appearance CLEAR CLEAR      Specific gravity 1.020 1.003 - 1.030      pH (UA) 5.5 5.0 - 8.0      Protein NEGATIVE  NEG mg/dL    Glucose NEGATIVE  NEG mg/dL    Ketone NEGATIVE  NEG mg/dL    Bilirubin NEGATIVE  NEG      Blood NEGATIVE  NEG      Urobilinogen 0.2 0.2 - 1.0 EU/dL    Nitrites NEGATIVE  NEG      Leukocyte Esterase NEGATIVE  NEG      WBC 0-4 0 - 4 /hpf    RBC 0-5 0 - 5 /hpf    Epithelial cells FEW FEW /lpf    Bacteria NEGATIVE  NEG /hpf    UA:UC IF INDICATED CULTURE NOT INDICATED BY UA RESULT CNI      Hyaline cast 0-2 0 - 5 /lpf   TYPE & SCREEN    Collection Time: 01/29/18  9:48 AM   Result Value Ref Range    Crossmatch Expiration 02/12/2018     ABO/Rh(D) [place] POSITIVE     Antibody screen NEG    EKG, 12 LEAD, INITIAL    Collection Time: 01/29/18 10:30 AM   Result Value Ref Range    Ventricular Rate 74 BPM    Atrial Rate 74 BPM    P-R Interval 162 ms    QRS Duration 88 ms    Q-T Interval 362 ms    QTC Calculation (Bezet) 401 ms    Calculated P Axis 47 degrees    Calculated T Axis 6 degrees    Diagnosis       Normal sinus rhythm  Cannot rule out Septal infarct , age undetermined  Abnormal ECG  When compared with ECG of 3/9/15  No significant change    Confirmed by Venita Briseno MD., Marilyn Castro ([id number]) on 1/29/2018 11:27:51 AM         Assessment:     Primary osteoarthritis of left total shoulder arthroplasty with biceps tenodesis and axillary nerve dissection. shoulder. Plan:     Scheduled for left shoulder total arthroplasty biceps tenodesis and axillary node dissection. Labs and EKG done per surgeon's orders. Lab results and EKG reviewed- unremarkable. MRSA pending. Patient has appointment with cardiology (Dr. Abdirahman Huber) 01/30/2018. Will request copy of note after visit.         Maddie Hood, NP

## 2018-02-12 NOTE — PERIOP NOTES
TRANSFER - OUT REPORT:    Verbal report given to Colette Fisher RN on Marcie Don.  being transferred to  for routine post - op       Report consisted of patients Situation, Background, Assessment and   Recommendations(SBAR). Information from the following report(s) SBAR, OR Summary, Intake/Output, MAR, Recent Results and Cardiac Rhythm NSR was reviewed with the receiving nurse. Lines:   Peripheral IV 02/12/18 Right Forearm (Active)   Site Assessment Clean, dry, & intact 2/12/2018  9:54 AM   Phlebitis Assessment 0 2/12/2018  9:54 AM   Infiltration Assessment 0 2/12/2018  9:54 AM   Dressing Status Clean, dry, & intact 2/12/2018  9:54 AM   Dressing Type Transparent;Tape 2/12/2018  9:54 AM   Hub Color/Line Status Pink;Patent; Infusing 2/12/2018  9:54 AM   Alcohol Cap Used Yes 2/12/2018  9:54 AM        Opportunity for questions and clarification was provided.       Patient transported with:   Registered Nurse

## 2018-02-12 NOTE — ANESTHESIA POSTPROCEDURE EVALUATION
Post-Anesthesia Evaluation and Assessment    Patient: Facundo Ortiz MRN: [id number]  SSN: [id number]    YOB: 1940  Age: 70 y.o. Sex: male       Cardiovascular Function/Vital Signs  Visit Vitals    /79 (BP 1 Location: Right arm, BP Patient Position: At rest)    Pulse 79    Temp 36.6 °C (97.8 °F)    Resp 14    SpO2 93%       Patient is status post general anesthesia for Procedure(s):  LEFT TOTAL SHOULDER ARTHROPLASTY (GEN WITH BLOCK), WITH BICEPS TENDONESIS AND AXILLARY NERVE DISSECTION. Nausea/Vomiting: None    Postoperative hydration reviewed and adequate. Pain:  Pain Scale 1: Numeric (0 - 10) (02/12/18 0950)  Pain Intensity 1: 0 (02/12/18 0950)   Managed    Neurological Status:   Neuro (WDL): Within Defined Limits (02/12/18 0950)  Neuro  LUE Motor Response: Numbness;Weak;Purposeful (02/12/18 0950)  LLE Motor Response: Purposeful (02/12/18 0950)  RUE Motor Response: Purposeful (02/12/18 0950)  RLE Motor Response: Purposeful (02/12/18 0950)   At baseline    Mental Status and Level of Consciousness: Arousable    Pulmonary Status:   O2 Device: Room air (02/12/18 0950)   Adequate oxygenation and airway patent    Complications related to anesthesia: None    Post-anesthesia assessment completed.  No concerns    Signed By: Jeanette Lockwood MD     February 12, 2018

## 2018-02-12 NOTE — INTERVAL H&P NOTE
H&P Update:  Marcie Don. was seen and examined. History and physical has been reviewed. The patient has been examined.  There have been no significant clinical changes since the completion of the originally dated History and Physical.    Signed By: Compa Wolf MD     February 12, 2018 7:12 AM

## 2018-02-12 NOTE — BRIEF OP NOTE
BRIEF OPERATIVE NOTE    Date of Procedure: 2/12/2018   Preoperative Diagnosis: LEFT SHOUDLER OA  Postoperative Diagnosis: LEFT SHOUDLER OA    Procedure: Procedure(s):  LEFT TOTAL SHOULDER ARTHROPLASTY (GEN WITH BLOCK), WITH BICEPS TENDONESIS AND AXILLARY NERVE DISSECTION    Surgeon: Modesta Payan MD  Assistant(s): Carlos Willis PA-C, ATC  Anesthesia: General   Estimated Blood Loss: minimal  Specimens: * No specimens in log *   Findings: OA  Complications: None  Implants:   Implant Name Type Inv.  Item Serial No.  Lot No. LRB No. Used Action   CEMENT BNE SIMPLEX W/GENT -- 10/PK - [id number]  CEMENT BNE SIMPLEX W/GENT -- 10/PK [id number] [employer] HOW [id number] Left 1 Implanted   SUTURE BUTTON   [id number]  [id number] Left 1 Implanted   POST VANDANA HYBRID PT REGENEREX -- COMPREHENSIVE - [id number]  POST VANDANA HYBRID PT REGENEREX -- COMPREHENSIVE [id number] [employer] [id number] Left 1 Implanted   BASEPLT VANDANA HYBRID LG 4MM --  - [id number]  BASEPLT VANDANA HYBRID LG 4MM --  [id number] [employer] [id number] Left 1 Implanted   HEAD HUM VERSA-DIAL STD TAPR --  - [id number]  HEAD HUM VERSA-DIAL STD TAPR --  [id number] [employer] [id number] Left 1 Implanted   HEAD HUM BPLR [id number] -- VERSA-DIAL - [id number]  HEAD HUM BPLR [id number] -- VERSA-DIAL [id number] [employer] [id number] Left 1 Implanted   STEM HUM LEONA MINI 15MM -- COMPREHENSIVE - [id number]   STEM HUM LEONA MINI 15MM -- COMPREHENSIVE [id number] [employer] [id number] Left 1 Implanted

## 2018-02-12 NOTE — PROGRESS NOTES
Primary Nurse Alexey Lopez RN and Jessica Yoon RN performed a dual skin assessment on this patient. No impairment noted beyond surgical incision and dime sized open area on R shin. Pt patient, \"I nicked it on something. \"  Macho score is 21.

## 2018-02-12 NOTE — ANESTHESIA PROCEDURE NOTES
Name:  Nathaniel Alexa Year:  10th Grade Class: Student ID No.:   Address:  [address] [place] [address] Phone:  [phone] (home)  : [age]year old   Name Relationship [address] Work Phone Home Phone Mobile Phone   1.  V Peripheral Block    Start time: 2/12/2018 7:01 AM  End time: 2/12/2018 7:12 AM  Performed by: Surya Slade  Authorized by: Surya Slade       Pre-procedure: Indications: at surgeon's request and post-op pain management    Preanesthetic Checklist: patient identified, risks and benefits discussed, site marked, timeout performed, anesthesia consent given and patient being monitored    Timeout Time: 07:01          Block Type:   Block Type:   Interscalene  Laterality:  Left  Monitoring:  Continuous pulse ox, frequent vital sign checks, heart rate, responsive to questions and oxygen  Injection Technique:  Continuous  Procedures: ultrasound guided    Patient Position: supine  Prep: chlorhexidine    Location:  Interscalene  Needle Type:  Tuohy  Needle Gauge:  18 G  Needle Localization:  Nerve stimulator and ultrasound guidance  Medication Injected:  0.5%  ropivacaine  Volume (mL):  30    Assessment:  Number of attempts:  1  Injection Assessment:  Incremental injection every 5 mL, local visualized surrounding nerve on ultrasound, negative aspiration for blood and no intravascular symptoms  Patient tolerance:  Patient tolerated the procedure well with no immediate complications polymorphic ventricular tachycardia? No   15. Does anyone in your family have a heart problem, pacemaker, or implanted defibrillator? No   16. Has anyone in your family had unexplained fainting, seizures, or near drowning?  No   BONE AND JOINT QUESTIONS 37. Do you have headaches with exercise? No   38. Have you ever had numbness, tingling, or weakness in your arms or legs after being hit or falling? No   39. Have you ever been unable to move your arms / legs after being hit /fall? No   40.  Have you ever be Appearance:  Marfan stigmata (kyphoscoliosis, high-arched palate, pectus excavatum,      arachnodactyly, arm span > height, hyperlaxity, myopia, MVP, aortic insufficiency) Yes obese   Eyes/Ears/Nose/Throat:    · Pupils equal  · Hearing Yes    Lymph nodes Y that I/our student will not use performance-enhancing substances as defined in the [facility] Performance-Enhancing Substance Testing Program Protocol.  We have reviewed the policy and understand that I/our student may be asked to submit to testing for the presen

## 2018-02-13 VITALS
HEART RATE: 80 BPM | RESPIRATION RATE: 18 BRPM | SYSTOLIC BLOOD PRESSURE: 141 MMHG | TEMPERATURE: 99.2 F | DIASTOLIC BLOOD PRESSURE: 82 MMHG | OXYGEN SATURATION: 95 %

## 2018-02-13 LAB
HGB BLD-MCNC: 10.5 G/DL (ref 12.1–17)
HGB BLD-MCNC: 9.5 G/DL (ref 12.1–17)

## 2018-02-13 PROCEDURE — 36415 COLL VENOUS BLD VENIPUNCTURE: CPT | Performed by: NURSE PRACTITIONER

## 2018-02-13 PROCEDURE — 74011250637 HC RX REV CODE- 250/637: Performed by: FAMILY MEDICINE

## 2018-02-13 PROCEDURE — 74011250637 HC RX REV CODE- 250/637: Performed by: ORTHOPAEDIC SURGERY

## 2018-02-13 PROCEDURE — 97535 SELF CARE MNGMENT TRAINING: CPT

## 2018-02-13 PROCEDURE — 97165 OT EVAL LOW COMPLEX 30 MIN: CPT

## 2018-02-13 PROCEDURE — 74011250636 HC RX REV CODE- 250/636: Performed by: ORTHOPAEDIC SURGERY

## 2018-02-13 PROCEDURE — 97161 PT EVAL LOW COMPLEX 20 MIN: CPT

## 2018-02-13 RX ORDER — OXYCODONE HYDROCHLORIDE 5 MG/1
5-10 TABLET ORAL
Qty: 75 TAB | Refills: 0 | Status: SHIPPED
Start: 2018-02-13 | End: 2018-02-13

## 2018-02-13 RX ORDER — ASPIRIN 325 MG
325 TABLET ORAL EVERY 12 HOURS
Qty: 28 TAB | Refills: 0 | Status: SHIPPED
Start: 2018-02-13 | End: 2018-02-27

## 2018-02-13 RX ORDER — HYDROCODONE BITARTRATE AND ACETAMINOPHEN 5; 325 MG/1; MG/1
1-2 TABLET ORAL
Qty: 60 TAB | Refills: 0 | Status: SHIPPED | OUTPATIENT
Start: 2018-02-13 | End: 2022-03-03

## 2018-02-13 RX ADMIN — VALSARTAN 320 MG: 80 TABLET, FILM COATED ORAL at 00:52

## 2018-02-13 RX ADMIN — ASPIRIN 325 MG: 325 TABLET, DELAYED RELEASE ORAL at 09:21

## 2018-02-13 RX ADMIN — DOCUSATE SODIUM AND SENNOSIDES 1 TABLET: 8.6; 5 TABLET, FILM COATED ORAL at 09:20

## 2018-02-13 RX ADMIN — Medication 10 ML: at 06:38

## 2018-02-13 RX ADMIN — AMLODIPINE BESYLATE 5 MG: 5 TABLET ORAL at 09:20

## 2018-02-13 RX ADMIN — SODIUM CHLORIDE 125 ML/HR: 900 INJECTION, SOLUTION INTRAVENOUS at 00:32

## 2018-02-13 RX ADMIN — POLYETHYLENE GLYCOL (3350) 17 G: 17 POWDER, FOR SOLUTION ORAL at 09:21

## 2018-02-13 RX ADMIN — ACETAMINOPHEN 650 MG: 325 TABLET ORAL at 11:40

## 2018-02-13 RX ADMIN — Medication 2 G: at 06:37

## 2018-02-13 RX ADMIN — ACETAMINOPHEN 650 MG: 325 TABLET ORAL at 06:37

## 2018-02-13 RX ADMIN — FAMOTIDINE 20 MG: 20 TABLET, FILM COATED ORAL at 09:20

## 2018-02-13 NOTE — PROGRESS NOTES
[facility]   Follow-up Consult Note    Date: 2/13/2018    Assessment/Plan:   Love Ayon is a 70 y.o. male with OA, HTN, HLD who is POD #1 s/p left total shoulder arthroplasty. The Family Medicine Service was consulted for medical management. Medically stable for discharge.     OA: POD #1 s/p left total shoulder athroplasty  -Management per primary team      HTN: BP stable post-operatively.   -Continue Norvasc 5mg and Diovan 320mg daily     HLD: No lipid panel on file  -Continue Zocor 10mg daily      FEN/GI - regular diet per primary team   Activity - Out of bed with assistance  DVT prophylaxis - SCD  GI prophylaxis -  Pepcid   Disposition - Plan to d/c to be determined by primary team     Thank you very much for allowing us to participate in the care of this pleasant patient. The family medicine service will continue to follow the patient's medical progress along with you. Please do not hesitate to page with any questions or to discuss the case (pager# [phone]). Patient to be discussed with Dr. Chung Lebron MD  [facility] Medicine Resident         Subjective  No acute events overnight. Patient has no complaints this morning. States that pain is well controlled. Denies chills, headaches, chest pain, shortness of breath, palpitations, abdominal pain, nausea and vomiting, and LE edema. No bowel movements in the last 24 hours. Positive flatus.       Inpatient Medications  Current Facility-Administered Medications   Medication Dose Route Frequency    bupivacaine (PF) 0.25 % (ON-Q BAG) 400 ml  6 mL/hr Local Infiltration CONTINUOUS    traMADol (ULTRAM) tablet 50 mg  50 mg Oral Q4H PRN    acetaminophen (TYLENOL) tablet 650 mg  650 mg Oral Q6H    0.9% sodium chloride infusion  125 mL/hr IntraVENous CONTINUOUS    sodium chloride (NS) flush 5-10 mL  5-10 mL IntraVENous Q8H    sodium chloride (NS) flush 5-10 mL  5-10 mL IntraVENous PRN    oxyCODONE IR (ROXICODONE) tablet 5 mg  5 mg Oral Q3H PRN    oxyCODONE IR (ROXICODONE) tablet 10 mg  10 mg Oral Q3H PRN    HYDROmorphone (PF) (DILAUDID) injection 0.5 mg  0.5 mg IntraVENous Q4H PRN    naloxone (NARCAN) injection 0.4 mg  0.4 mg IntraVENous PRN    ondansetron (ZOFRAN) injection 4 mg  4 mg IntraVENous Q4H PRN    diphenhydrAMINE (BENADRYL) 12.5 mg/5 mL oral elixir 12.5 mg  12.5 mg Oral Q6H PRN    famotidine (PEPCID) tablet 20 mg  20 mg Oral BID    senna-docusate (PERICOLACE) 8.6-50 mg per tablet 1 Tab  1 Tab Oral BID    polyethylene glycol (MIRALAX) packet 17 g  17 g Oral DAILY    aspirin delayed-release tablet 325 mg  325 mg Oral BID    simvastatin (ZOCOR) tablet 10 mg  10 mg Oral QHS    valsartan (DIOVAN) tablet 320 mg  320 mg Oral QPM    amLODIPine (NORVASC) tablet 5 mg  5 mg Oral DAILY         Allergies  No Known Allergies      Objective  Vitals:  Patient Vitals for the past 8 hrs:   Temp Pulse Resp BP SpO2   02/13/18 0821 99.2 °F (37.3 °C) 80 18 141/82 95 %   02/13/18 0416 - 77 18 149/84 96 %         I/O:    Intake/Output Summary (Last 24 hours) at 02/13/18 0851  Last data filed at 02/12/18 1936   Gross per 24 hour   Intake          2378.34 ml   Output               75 ml   Net          2303.34 ml     Last shift:       Last 3 shifts:    02/11 1901 - 02/13 0700  In: 2378.3 [I.V.:2378.3]  Out: 75     Physical Exam:  General: No acute distress. Alert. Cooperative. Head: Normocephalic. Atraumatic. Eyes:  Conjunctiva pink. Sclera white. PERRL. Nose:  Septum midline. Mucosa pink. No drainage. Throat: Mucosa pink. Moist mucous membranes. No tonsillar exudates or erythema. Palate movement equal bilaterally. Respiratory: CTAB. No w/r/r/c.   Cardiovascular: RRR. Normal S1,S2. No m/r/g. Pulses 2+ throughout. GI: + bowel sounds. Nontender. No rebound tenderness or guarding. Nondistended   Extremities: No edema. No palpable cord. No tenderness.  LUE in sling     Laboratory Data  No results found for this or any previous visit (from the past 12 hour(s)). Hospital Problems  Hospital Problems  Date Reviewed: 3/18/2015          Codes Class Noted POA    * (Principal)Osteoarthritis of left shoulder ICD-10-CM: M19.012  ICD-9-CM: 715.91  2/12/2018 Unknown            [address] Medicine Residency Attending Addendum:  I saw and evaluated the patient, performing the key elements of the service. I discussed the findings, assessment and plan with the resident and agree with the resident's findings and plan as documented in the resident's note.     The patient was seen on 2/13/18  \"Ready to go home\"  No complaints    Vitals:    02/12/18 2352 02/13/18 0046 02/13/18 0416 02/13/18 0821   BP: 150/83 147/71 149/84 141/82   Pulse: 72  77 80   Resp: 17 18 18   Temp: 98.6 °F (37 °C)   99.2 °F (37.3 °C)   SpO2: 96%  96% 95%     NAD        Assessment/Plan:   S/p left tsa  Stable for discharge from medicine  Precautions given    Hospital Problems  Date Reviewed: 3/18/2015          Codes Class Noted POA    * (Principal)Osteoarthritis of left shoulder ICD-10-CM: Q61.510  ICD-9-CM: 715.91  2/12/2018 Unknown

## 2018-02-13 NOTE — PROGRESS NOTES
Occupational Therapy EVALUATION/discharge  Patient: Bharat Houston (79 y.o. male)  Date: 2/13/2018  Primary Diagnosis: LEFT SHOUDLER OA  Osteoarthritis of left shoulder  Procedure(s) (LRB):  LEFT TOTAL SHOULDER ARTHROPLASTY (GEN WITH BLOCK), WITH BICEPS TENDONESIS AND AXILLARY NERVE DISSECTION (Left) 1 Day Post-Op   Precautions: NWB LUE, sling on when OOB        ASSESSMENT:   Based on the objective data described below, the patient presents with hospital admission secondary to left shoulder arthoplasty. Patient received supine in bed with HOB elevated slightly. Patient family present in room and will assist patient at home. Patient able to move to EOB with CGA and upon sitting he reports no dizziness or being lightheaded. Patient educated on proper dressing technique and able to demonstrate with min assist for LE dressing secondary to limited use of LUE and min assist to don shirt/sling. Patient and family educated on use of sling, task modifications and activity limitations. Patient and family verbalized understanding. Further skilled acute occupational therapy is not indicated at this time. Discharge Recommendations: Outpatient  Further Equipment Recommendations for Discharge: none noted       SUBJECTIVE:   Patient stated I have had several other surgeries. I will follow the rules.     OBJECTIVE DATA SUMMARY:   HISTORY:   Past Medical History:   Diagnosis Date    Arthritis     Cataracts, bilateral     Chronic pain     back    Hypercholesteremia     Hypertension     Macular degeneration     Other ill-defined conditions(799.89) 1966    numerous blood transfusions - raymundo nam- injury to right leg    Other ill-defined conditions(799.89) 2011    4 units PRBCs in [place] for knee revision    Unspecified adverse effect of anesthesia 1966    convulsion post op leg surgery x 1     Past Surgical History:   Procedure Laterality Date    HX ADENOIDECTOMY      HX ORTHOPAEDIC  1966    right leg-x 10 surgeries [facility] injury    HX ORTHOPAEDIC  1997    right knee replacement    HX ORTHOPAEDIC  2011, 2017    right knee revision    HX ORTHOPAEDIC  01/2015    left knee arthroscopy    HX ORTHOPAEDIC  11/2015    left knee replacement    HX ORTHOPAEDIC  03/2015    left elbow and carpal tunnel    HX OTHER SURGICAL  2004    normal cardiac cath       Prior Level of Function/Environment/Context: independent   Occupations in which the patient is/was successful, what are the barriers preventing that success:   Performance Patterns (routines, roles, habits, and rituals):   Personal Interests and/or values:   Expanded or extensive additional review of patient history:     Home Situation  Home Environment: Private residence  # Steps to Enter: 3  Rails to Enter: Yes  Hand Rails : Left  One/Two Story Residence: One story  Living Alone: No  Support Systems: Spouse/Significant Other/Partner  Patient Expects to be Discharged to[de-identified] Private residence  Current DME Used/Available at Home: [address] [facility], [place], [address] [facility] , [facility], [address], rolling, Shower chair, Raised toilet seat  Tub or Shower Type: Shower  []  Right hand dominant   []  Left hand dominant    EXAMINATION OF PERFORMANCE DEFICITS:  Cognitive/Behavioral Status:  Neurologic State: Alert  Orientation Level: Oriented X4  Cognition: Appropriate decision making; Follows commands  Perception: Appears intact  Perseveration: No perseveration noted  Safety/Judgement: Awareness of environment    Skin: intact as seen    Edema: swelling to L shoulder     Hearing: Auditory  Auditory Impairment: Hard of hearing, bilateral  Hearing Aids/Status:  At home, Bilateral    Vision/Perceptual:           Range of Motion:  RUE WFL   LUE elbow, wrist, hand WFL    Strength:  WFL RUE                    Coordination:     Fine Motor Skills-Upper: Left Impaired;Right Intact    Gross Motor Skills-Upper: Left Impaired;Right Intact    Tone & Sensation:  Normal tone       Balance:  Sitting: Intact  Standing: Intact    Functional Mobility and Transfers for ADLs:  Bed Mobility:  Supine to Sit: Supervision    Transfers:  Sit to Stand: Contact guard assistance  Stand to Sit: Stand-by asssistance  Toilet Transfer : Contact guard assistance    ADL Assessment:  Feeding: Supervision    Oral Facial Hygiene/Grooming: Supervision    Bathing: Supervision    Upper Body Dressing: Minimum assistance    Lower Body Dressing: Minimum assistance (tying shoes, starting socks over toes)    Toileting: Contact guard assistance                ADL Intervention and task modifications:         Cognitive Retraining  Safety/Judgement: Awareness of environment    Therapeutic Exercise:    (Days 1 to 3) Exercises:      EXERCISE   Sets   Reps   Active Active Assist   Passive   Comments   Elbow Flexion/ extesnsion 1 3 [x]           []           []            2x day   Wrist flexion/ extension 1 5 [x]           []           []            2x day    Hand flexion/ extension 1 5 [x]           []           []            2x day    Supine passive forward elevation    Completed sitting EOB to 90degrees  1 3 []           []           [x]            2x day; plane of scapula (PFE) to  Tolerance; Supine PFE by family member or using opposite arm      Codman's pendulum 1 5 []           []           [x]            clockwise/counter clockwise    C-spine AROM 1 3 [x]           []           []            rotation, flexion, + lateral flexion     Cautions:  - Assure normal neurovascular status  - No lifting of involved arm  - Shoulder extension is limited; Elbow not to go behind midline of body  - Protect the subscapularis repair    Goals:  -Maintain stable prosthesis  -Minimize pain and inflammatory response  -Achieve staged ROM goals  -Establish stable scapula  -Initiate pain free rotator cuff and deltoid strengthening      Functional Measure:  Barthel Index:    Bathin  Bladder: 10  Bowels: 10  Groomin  Dressin  Feeding: 10  Mobility: 15  Stairs: 5  Toilet Use: 10  Transfer (Bed to Chair and Back): 15  Total: 85       Barthel and G-code impairment scale:  Percentage of impairment CH  0% CI  1-19% CJ  20-39% CK  40-59% CL  60-79% CM  80-99% CN  100%   Barthel Score 0-100 100 99-80 79-60 59-40 20-39 1-19   0   Barthel Score 0-20 20 17-19 13-16 9-12 5-8 1-4 0      The Barthel ADL Index: Guidelines  1. The index should be used as a record of what a patient does, not as a record of what a patient could do. 2. The main aim is to establish degree of independence from any help, physical or verbal, however minor and for whatever reason. 3. The need for supervision renders the patient not independent. 4. A patient's performance should be established using the best available evidence. Asking the patient, friends/relatives and nurses are the usual sources, but direct observation and common sense are also important. However direct testing is not needed. 5. Usually the patient's performance over the preceding 24-48 hours is important, but occasionally longer periods will be relevant. 6. Middle categories imply that the patient supplies over 50 per cent of the effort. 7. Use of aids to be independent is allowed. Go Hairston., Barthel, D.W. ([id number]). Functional evaluation: the Barthel Index. [address] [facility] (14)2. William Hernandez florian YISSEL Hawthorne, Ayse Martinez., Guy Arshad., [place], [address] [place] [address] (1999). Measuring the change indisability after inpatient rehabilitation; comparison of the responsiveness of the Barthel Index and Functional Madison Measure. Journal of Neurology, Neurosurgery, and Psychiatry, 66(4), 510-142. Shirley Grimm, N.J.A, JIM Krueger, & Gisela Ochoa, MYungA. (2004.) Assessment of post-stroke quality of life in cost-effectiveness studies: The usefulness of the Barthel Index and the EuroQoL-5D. Quality of Life Research, 13, 749-68         G codes:   In compliance with CMSs Claims Based Outcome Reporting, the following G-code set was chosen for this patient based on their primary functional limitation being treated: The outcome measure chosen to determine the severity of the functional limitation was the Barthel Index  with a score of 85/100 which was correlated with the impairment scale. ? Self Care:     - CURRENT STATUS: CI - 1%-19% impaired, limited or restricted    - GOAL STATUS: CI - 1%-19% impaired, limited or restricted    - D/C STATUS:  CI - 1%-19% impaired, limited or restricted     Occupational Therapy Evaluation Charge Determination   History Examination Decision-Making   LOW Complexity : Brief history review  LOW Complexity : 1-3 performance deficits relating to physical, cognitive , or psychosocial skils that result in activity limitations and / or participation restrictions  LOW Complexity : No comorbidities that affect functional and no verbal or physical assistance needed to complete eval tasks       Based on the above components, the patient evaluation is determined to be of the following complexity level: LOW   Pain:  Pain Scale 1: Numeric (0 - 10)  Pain Intensity 1: 0              Activity Tolerance:   VSS  Please refer to the flowsheet for vital signs taken during this treatment. After treatment:   []  Patient left in no apparent distress sitting up in chair  [x]  Patient left in no apparent distress in bed  [x]  Call bell left within reach  [x]  Nursing notified  [x]  Caregiver present  []  Bed alarm activated    COMMUNICATION/EDUCATION:   Communication/Collaboration:  [x]      Home safety education was provided and the patient/caregiver indicated understanding. [x]      Patient/family have participated as able and agree with findings and recommendations. []      Patient is unable to participate in plan of care at this time.   Findings and recommendations were discussed with: Physical Therapist and Registered Nurse    WALLY Sneed/L  Time Calculation: 31 mins

## 2018-02-13 NOTE — PROGRESS NOTES
Spiritual Care Partner Volunteer visited patient in [address] [facility] on 2/13/18.   Documented by:  Stacey Hallman [address] [facility] Teresa ([id number])

## 2018-02-13 NOTE — PROGRESS NOTES
Ortho Progress Note     Patient: David Cline MRN: [id number]  SSN: [id number]    YOB: 1940  Age: 70 y.o. Sex: male      POD:    1 Day Post-Op  S/P:    Procedure(s):  LEFT TOTAL SHOULDER ARTHROPLASTY (GEN WITH BLOCK), WITH BICEPS TENDONESIS AND AXILLARY NERVE DISSECTION     Subjective:     David Cline is resting in bed with the appropriate level of discomfort. The patient denies complaints of dyspnea or chest pain. Objective  Objective:   Patient Vitals for the past 12 hrs:   BP Temp Pulse Resp SpO2   02/13/18 0821 141/82 99.2 °F (37.3 °C) 80 18 95 %   02/13/18 0416 149/84 - 77 18 96 %   02/13/18 0046 147/71 - - - -   02/12/18 2352 150/83 98.6 °F (37 °C) 72 17 96 %     Recent Labs      02/12/18   0620   HGB  9.5*       Patient is alert and oriented x 3 in NAD. The operative shoulder dressing is clean, dry, and intact. The interscalene block catheter is intact without drainage. The patient is neurovascularly intact. The sling and ice are properly positioned on the operative arm. Assessment:     Assessment:   Status post shoulder arthroplasty       Plan:   1. We will continue the current pain management regimen. 2. Physical therapy and occupational therapy will initiate treatment. 3. The patient will be discharged home if medically stable,  the pain level is well controlled and they have been cleared by physical therapy. [address] will be discharged home with the peripheral nerve catheter intact. Explicit discharge instructions have been provided to the patient for removal of the nerve block as well post operative instructions and prescriptions. 5. The patient is to follow-up in the office at the scheduled appointment in 10-14 days.

## 2018-02-13 NOTE — DISCHARGE INSTRUCTIONS
Shoulder Replacement Surgery Discharge Instructions  Dr. Flaquita Falcon    Please take the time to review the following instructions before you leave the hospital and use them as guidelines during your recovery from surgery. If you have any questions, you may contact my office at [phone]. Would Care / Dressing Change    Do NOT remove your dressing. Keep the clear, plastic dressing intact until your follow up in the office. Showering / Bathing    If the clear plastic dressing is intact and there is no drainage, you may shower. If the dressing is loose or water gets under it please notify our office. If there is drainage, please call the office immediately. Sling    Keep your arm in the immobilizer/sling at all times except when showering, changing your clothes, and doing the exercises shown to you by Dr. Jeanine Bolton or your physical/occupational therapist prior to your discharge from the hospital.  Keep your arm at your side when changing your clothes and showering. Do not move it away from your body. Ice and Elevation    Ice therapy should be used consistently for 48 hours after surgery. Subsequently, you should ice 3 times per day for 20 minutes at a time. After the first week, you may use ice as needed for pain and swelling. Please ensure there is protective barrier between your skin and the ice. Diet    You may advance your regular diet as tolerated. Increase your clear liquid intake for the next 2-3 days. Medications  Your prescriptions were sent to the pharmacy on file. We are unable to change the  narcotic prescription that has already been sent today. If you would like to change your pharmacy for FUTURE prescriptions, please call the office. 1. Pain: You were prescribed a narcotic medication for pain control. Please  use the medication as prescribed.   Pain medications may cause constipation - Colace or Milk of Magnesia may be used as needed. Other possible side effects of pain medications are dizziness, headache, nausea, vomiting, and urinary retention. Discontinue the pain medication if you develop itching, rash, shortness of breath, or difficulties swallowing. If these symptoms become severe or arent relieved by discontinuing the medication, you should seek immediate medical attention. You cannot drive or operate machinery while taking narcotic medication. Some pain medications already contain Tylenol/Acetaminophen. Please read your prescription pain medicine bottle prior to taking additional Tylenol. Do not exceed 3000mg of Tylenol/Acetaminophen in a 24 hour period. Refills of pain medication are authorized during office hours only   ( Monday to Friday 8am-5pm)        2. Blood Thinner:  You have been given a prescription for Aspirin 325mg. Please take one tablet twice daily for 14 days. Do not take anti-inflammatory medication (Advil, Aleve, Motrin) while taking the blood thinner. 3. Stool Softeners:  Pain medications can cause constipation. Stool softeners, warm prune juice and increasing your water and fiber intake can help prevent constipation. Do not take laxatives. 4. You should resume other medications you were taking prior to your surgery. Pain medication may change the effects of any antidepressant medication you are taking. If you have any questions about possible interactions between your regular medications and the pain medication you should consult the physician who prescribes your regular medications. Physical Therapy:  You must begin outpatient physical therapy 2-3 days after your surgery. Your were given a prescription when you scheduled your surgery. If you do not have an appointment scheduled or a therapy prescription please call Dr. Gio Zavaleta' office immediately.      APPOINTMENT:  uLna Villa 2-15 at 7:30    Follow-Up Appointment:  Please follow up at your scheduled appointment 10-14 days from the day of your surgery. If you do not already have an appointment, please call our office at [phone] for your follow up appointment. Your appointment will likely be with Harika Velasquez PA-C. Dirk Matamoros assists Dr. Mary Pérez in surgery and will be able to review your operation and answer your questions. APPOINTMENT: 2-26 at 1:15pm       Important Signs and Symptoms:  If any of the following signs and symptoms occurs, you should contact Dr. Jessica Perez office. Please be advised if a problem arises which you feel requires immediate medical attention or you are unable to contact Dr. Jessica Perez office, you should seek immediate medical attention. Signs and symptoms to watch for include:    1. A sudden increase in swelling and/or redness or warmth at the area of your surgery which isnt relieved by rest, ice, and elevation. 2. Oral temperature greater than 101degrees for 12 hours or more which isnt relieved by an increase in fluid intake and taking two Tylenol every 4-6 hours. 3. Excessive drainage from your incisions, or drainage which hasnt stopped by 72 hours after your surgery. 4. Calf pain, ever, chills, shortness of breath, chest pain, nausea, vomiting or other signs and symptoms which are of concern to you. Unless you are having a true medical emergency,   you MUST call Dr. Mary Pérez' office   BEFORE proceeding to the Emergency Department. A provider is on call 24 hours/day. Exercises after Shoulder Surgery  1.  Passive Forward Flexion:                             Instructions:  - Lay on your back  - Take your non surgical arm and grab the wrist of your surgical arm   - Use your non surgical arm to lift your surgical arm over your head with the elbow straight   - Slowly return your arm to your side using your non surgical arm  - Repeat 20 times in the morning and 20 times in the evening  Remember:  - Passive motion: Your arm is lifted with the help of your other hand. o The repair is protected when you do passive motion  - Active motion: You lift your arm by using your shoulder muscles. o DO NOT DO ANY ACTIVE MOTION FOR NOW    2. Codman Pendulum Exercises                                         Instructions:  - Bend forward about 90° at the waist and support yourself on a sturdy object with your non surgical arm  (bend slightly at the knees to protect your back)  - Gently allow your surgical arm to hang towards the ground  - Move your hips forward and backward allowing your arm to swing slightly  - Arm can move forward, backward, and in small circles (clockwise and counterclockwise)  o Remember: let your body move your arm, do not use your arm muscles  - Begin performing the exercise for about 30 seconds. Progress to 2 minutes.   - Repeat 2-3 times per day

## 2018-02-13 NOTE — PROGRESS NOTES
physical Therapy EVALUATION/DISCHARGE  Patient: Eleanor Lee (79 y.o. male)  Date: 2/13/2018  Primary Diagnosis: LEFT SHOUDLER OA  Osteoarthritis of left shoulder  Procedure(s) (LRB):  LEFT TOTAL SHOULDER ARTHROPLASTY (GEN WITH BLOCK), WITH BICEPS TENDONESIS AND AXILLARY NERVE DISSECTION (Left) 1 Day Post-Op   Precautions:  NWB, Fall (NWB left shoulder)  ASSESSMENT :  Based on the objective data described below, the patient presents with baseline functional strength and ROM to BLE, safety with transfers, gait and steps following admission for left TSA. Patient received dressed and finishing with OT with sling in place to LUE. He stood and ambulated 200 feet with fast reid and no loss of balance. He went up and down 4 steps with supervision. Wife and daughter present for PT session. Patient cleared for discharge from a PT standpoint. Skilled physical therapy is not indicated at this time. PLAN :  Discharge Recommendations: Outpatient  Further Equipment Recommendations for Discharge: none     SUBJECTIVE:   Patient stated I don't need that cane.     OBJECTIVE DATA SUMMARY:   HISTORY:    Past Medical History:   Diagnosis Date    Arthritis     Cataracts, bilateral     Chronic pain     back    Hypercholesteremia     Hypertension     Macular degeneration     Other ill-defined conditions(799.89) 1966    numerous blood transfusions - raymundo nam- injury to right leg    Other ill-defined conditions(799.89) 2011    4 units PRBCs in [place] for knee revision    Unspecified adverse effect of anesthesia 1966    convulsion post op leg surgery x 1     Past Surgical History:   Procedure Laterality Date    HX ADENOIDECTOMY      HX ORTHOPAEDIC  1966    right leg-x 10 surgeries [facility] injury    HX ORTHOPAEDIC  1997    right knee replacement    HX ORTHOPAEDIC  2011, 2017    right knee revision    HX ORTHOPAEDIC  01/2015    left knee arthroscopy    HX ORTHOPAEDIC  11/2015    left knee replacement    HX ORTHOPAEDIC  2015    left elbow and carpal tunnel    HX OTHER SURGICAL  2004    normal cardiac cath     Prior Level of Function/Home Situation: independent  Personal factors and/or comorbidities impacting plan of care: none    Home Situation  Home Environment: Private residence  # Steps to Enter: 3  Rails to Enter: Yes  Hand Rails : Left  One/Two Story Residence: One story  Living Alone: No  Support Systems: Spouse/Significant Other/Partner  Patient Expects to be Discharged to[de-identified] Private residence  Current DME Used/Available at Home: [address] [facility], [place], quad, Grab bars, Walker, rolling, Shower chair, Raised toilet seat  Tub or Shower Type: Shower    EXAMINATION/PRESENTATION/DECISION MAKING:   Critical Behavior:  Neurologic State: Alert  Orientation Level: Oriented X4  Cognition: Appropriate decision making, Follows commands  Safety/Judgement: Awareness of environment  Hearing: Auditory  Auditory Impairment: Hard of hearing, bilateral  Hearing Aids/Status: At home, Bilateral      Range Of Motion:  AROM: Within functional limits (BLE)                       Strength:    Strength:  Within functional limits (BLE)                    Tone & Sensation:   Tone: Normal              Sensation: Intact (except LUE due to nerve block)               Coordination:  Coordination: Within functional limits       Functional Mobility:  Bed Mobility:     Supine to Sit: Supervision        Transfers:  Sit to Stand: Supervision  Stand to Sit: Supervision                       Balance:   Sitting: Intact  Standing: Intact  Ambulation/Gait Training:  Distance (ft): 200 Feet (ft)  Assistive Device: Gait belt;Brace/Splint  Ambulation - Level of Assistance: Supervision                       Speed/Jelly: Accelerated                            Stairs:  Number of Stairs Trained: 4  Stairs - Level of Assistance: Contact guard assistance   Rail Use: Left  (used left rail by side stepping)         Functional Measure:  Barthel Index:    Bathin  Bladder: 10  Bowels: 10  Groomin  Dressin  Feeding: 10  Mobility: 15  Stairs: 5  Toilet Use: 10  Transfer (Bed to Chair and Back): 15  Total: 85       Barthel and G-code impairment scale:  Percentage of impairment CH  0% CI  1-19% CJ  20-39% CK  40-59% CL  60-79% CM  80-99% CN  100%   Barthel Score 0-100 100 99-80 79-60 59-40 20-39 1-19   0   Barthel Score 0-20 20 17-19 13-16 9-12 5-8 1-4 0      The Barthel ADL Index: Guidelines  1. The index should be used as a record of what a patient does, not as a record of what a patient could do. 2. The main aim is to establish degree of independence from any help, physical or verbal, however minor and for whatever reason. 3. The need for supervision renders the patient not independent. 4. A patient's performance should be established using the best available evidence. Asking the patient, friends/relatives and nurses are the usual sources, but direct observation and common sense are also important. However direct testing is not needed. 5. Usually the patient's performance over the preceding 24-48 hours is important, but occasionally longer periods will be relevant. 6. Middle categories imply that the patient supplies over 50 per cent of the effort. 7. Use of aids to be independent is allowed. Jorge Hoskins., Barthel, D.W. (0892). Functional evaluation: the Barthel Index. [address] [facility] (14)2. Gary Thorne, CHEMA.JFOX, Mika Santiago., Stephensenrique KovacsC[facility]averal [facility], [address] [place] [address] (). Measuring the change indisability after inpatient rehabilitation; comparison of the responsiveness of the Barthel Index and Functional Perkins Measure. Journal of Neurology, Neurosurgery, and Psychiatry, 66(4), 720-347. Niru Bright, RADHA.CHEMA.A, JIM Krueger, & Marbella Stokes MRASHAWN. (2004.) Assessment of post-stroke quality of life in cost-effectiveness studies: The usefulness of the Barthel Index and the EuroQoL-5D.  Quality of Life Research, 15, 591-59              Physical Therapy Evaluation Charge Determination   History Examination Presentation Decision-Making   LOW Complexity : Zero comorbidities / personal factors that will impact the outcome / POC LOW Complexity : 1-2 Standardized tests and measures addressing body structure, function, activity limitation and / or participation in recreation  LOW Complexity : Stable, uncomplicated  LOW Complexity : FOTO score of       Based on the above components, the patient evaluation is determined to be of the following complexity level: LOW     Pain:  Pain Scale 1: Numeric (0 - 10)  Pain Intensity 1: 0     Activity Tolerance:   good  Please refer to the flowsheet for vital signs taken during this treatment. After treatment:   []   Patient left in no apparent distress sitting up in chair  [x]   Patient left in no apparent distress in bed  [x]   Call bell left within reach  [x]   Nursing notified  [x]   Caregiver present  []   Bed alarm activated    COMMUNICATION/EDUCATION:   Communication/Collaboration:  [x]   Fall prevention education was provided and the patient/caregiver indicated understanding. [x]   Patient/family have participated as able and agree with findings and recommendations. []   Patient is unable to participate in plan of care at this time.   Findings and recommendations were discussed with: Occupational Therapist and Registered Nurse    Thank you for this referral.  Ted Warren, PT   Time Calculation: 10 mins

## 2018-02-13 NOTE — PROGRESS NOTES
Discharge instructions reviewed with patient, his daughter and his wife. One prescription e-prescribed to pharmacy (asa) and one given as hard copy (norco). Administration instructions and common side effects reviewed. Detailed instruction on how and when to remove on-Q provided. Pt given on-Q information booklet. Pt and family verbalized understanding of all teaching. All agreeable to discharge home at this time. PIV removed prior to discharge. Pt tolerating regular diet and voiding adequately. Pt cleared PT and OT. Shoulder immobilizer and ice packs in place at discharge. Two extra ice packs sent home with patient.

## 2018-02-13 NOTE — INTERDISCIPLINARY ROUNDS
[address]    Patient Information    Name: Leona Zabala. Age: 70 y.o. Admission Date: 2/12/2018  Surgery/Procedure: Procedure(s):  LEFT TOTAL SHOULDER ARTHROPLASTY (GEN WITH BLOCK), WITH BICEPS TENDONESIS AND AXILLARY NERVE DISSECTION  Attending Provider: Laura Maria MD  Surgeon: Shanon Malhotra   Problem List: Principal Problem:    Osteoarthritis of left shoulder (2/12/2018)        During rounds the following quality care indicators and evidence based practices were addressed :       PT/OT: Patient mobility - Patient has been cleared for discharge by physical and occupational therapy. Bed Mobility Training  Supine to Sit: Supervision  Transfer Training  Sit to Stand: Contact guard assistance  Stand to Sit: Stand-by asssistance                 Pain Assessment  Pain Intensity 1: 0 (02/13/18 0744)        Patient Stated Pain Goal: 0    Pain meds: oxycodone (wants hydrocodone instead)  Antibiotics completed: yes  Anticoagulation: ASA bid  Bowel regimen: yes  Mechanical DVT prophylaxis: SCDs  Hou: n    RRAT Score:      Readmit Risk Tool  Support Systems: Spouse/Significant Other/Partner  Relationship with Primary Physician Group: Seen at least one time within the past 6 months    Discharge Management/Planning:    Readmit Risk Assessment Information:   Low Risk            10       Total Score        3 Has Seen PCP in Last 6 Months (Yes=3, No=0)    2 . Living with Significant Other. Assisted Living. LTAC. SNF. or   Rehab    5 Pt.  Coverage (Medicare=5 , Medicaid, or Self-Pay=4)        Criteria that do not apply:    Patient Length of Stay (>5 days = 3)    IP Visits Last 12 Months (1-3=4, 4=9, >4=11)    Charlson Comorbidity Score (Age + Comorbid Conditions)         Readmit Risk Tool  Support Systems: Spouse/Significant Other/Partner  Relationship with Primary Physician Group: Seen at least one time within the past 6 months    Anticipated Date of Discharge: today    Interdisciplinary team rounds were held with the following team members: Nurse Practitioner, Nursing, Pharmacy, and Rehab. See clinical pathway and/or care plan for interventions and desired outcomes.

## 2018-02-13 NOTE — DISCHARGE SUMMARY
Total Shoulder Arthoplasty Discharge Summary    Patient ID:  Claire Benjamin  1940  70 y.o.  [id number]    Admit date: 2/12/2018    Discharge date and time: No discharge date for patient encounter. Admitting Physician: Monique Montejo MD     Admission Diagnoses: LEFT SHOUDLER OA  Osteoarthritis of left shoulder    Discharge Diagnoses: Principal Problem:    Osteoarthritis of left shoulder (2/12/2018)        HISTORY OF PRESENT ILLNESS:  Claire Benjamin is a pleasant 70 y.o. long standing patient with advanced osteoarthritis of the shoulder. The patient failed all conservative management. Therefore, Dr. Keke Del Valle and the patient decided the most appropriate plan of care is to proceed with total shoulder arthroplasty, to be preformed at [place] [address]. All preoperative clearance was done prior to surgery. The patient's complete history and physical, laboratory data and physical exam is well documented in hospital chart. HOSPITAL COURSE:  Claire Benjamin was admitted on2/12/2018 and underwent successful total shoulder arthroplasty. There were no complications intraoperatively and the patient was transferred to the orthopaedic floor in stable condition. A consultations was made to a primary care physician whom continued to monitor the patient throughout the patient's hospitalizations. Physical therapy and occupational therapy initiated their evaluation and treatment and continued to follow the patient until the patient was discharged. For pain control, an interscalene nerve block was used, and was discontinued on post-operative day 2. The patient then was transitioned over oral narcotics in which was tolerated well. The incision site remained clean, dry, and intact. The patient remained neurovascularly intact.  At the time of discharge, the patient was able to ambulate safely, go up and down stairs and had an understanding of the explicit discharge precautions and instructions following surgery. Post Op complications: none    Current Discharge Medication List      START taking these medications    Details   oxyCODONE IR (ROXICODONE) 5 mg immediate release tablet Take 1-2 Tabs by mouth every four (4) hours as needed for Pain. Max Daily Amount: 60 mg.  Qty: 75 Tab, Refills: 0    Associated Diagnoses: Primary osteoarthritis of left shoulder      aspirin (ASPIRIN) 325 mg tablet Take 1 Tab by mouth every twelve (12) hours for 14 days. Take with food  Qty: 28 Tab, Refills: 0         CONTINUE these medications which have NOT CHANGED    Details   simvastatin (ZOCOR) 10 mg tablet Take 10 mg by mouth nightly. VIT A/VIT C/VIT E/ZINC/COPPER (PRESERVISION AREDS PO) Take 1 Tab by mouth two (2) times a day. valsartan (DIOVAN) 320 mg tablet Take 320 mg by mouth every evening. amLODIPine (NORVASC) 5 mg tablet Take 5 mg by mouth every morning. mupirocin calcium (BACTROBAN NASAL) 2 % nasal ointment by Both Nostrils route two (2) times a day. STOP taking these medications       aspirin delayed-release 81 mg tablet Comments:   Reason for Stopping:               Discharged to: Home    Discharge instructions:  -Resume pre hospital diet.            -Resume home medications per medication reconciliation form. -Prescriptions for pain medication were provided to the patient.     -Attend physical therapy as directed by the physician.  -Patient to wear sling for at least 4 weeks.  -Ambulate with an assisted device as instructed by PT/OT prior to discharge.   -First follow-up appointment to be scheduled in 10 days. -If patient is unaware of their appointment time/date, they are call Dr. Hui Police office at [phone].  -Explicit discharge instructions were provided to the patient.     Arya Reno MD

## 2018-02-13 NOTE — ROUTINE PROCESS
Bedside and Verbal shift change report given to Feliciano Genao RN (oncoming nurse) by Amelia Schumacher RN (offgoing nurse). Report included the following information SBAR, Procedure Summary, Intake/Output, MAR and Recent Results.

## 2019-04-24 ENCOUNTER — ED HISTORICAL/CONVERTED ENCOUNTER (OUTPATIENT)
Dept: OTHER | Age: 79
End: 2019-04-24

## 2022-03-03 ENCOUNTER — HOSPITAL ENCOUNTER (OUTPATIENT)
Dept: PREADMISSION TESTING | Age: 82
Discharge: HOME | End: 2022-03-03
Payer: MEDICARE

## 2022-03-03 VITALS
TEMPERATURE: 98.6 F | HEIGHT: 70 IN | DIASTOLIC BLOOD PRESSURE: 73 MMHG | OXYGEN SATURATION: 98 % | WEIGHT: 181.2 LBS | RESPIRATION RATE: 16 BRPM | HEART RATE: 76 BPM | SYSTOLIC BLOOD PRESSURE: 134 MMHG | BODY MASS INDEX: 25.94 KG/M2

## 2022-03-03 LAB
ANION GAP SERPL CALC-SCNC: 5 MMOL/L (ref 5–15)
BUN SERPL-MCNC: 20 MG/DL (ref 6–20)
BUN/CREAT SERPL: 23 (ref 12–20)
CALCIUM SERPL-MCNC: 8.6 MG/DL (ref 8.5–10.1)
CHLORIDE SERPL-SCNC: 107 MMOL/L (ref 97–108)
CO2 SERPL-SCNC: 28 MMOL/L (ref 21–32)
CREAT SERPL-MCNC: 0.88 MG/DL (ref 0.7–1.3)
GLUCOSE SERPL-MCNC: 94 MG/DL (ref 65–100)
POTASSIUM SERPL-SCNC: 4 MMOL/L (ref 3.5–5.1)
SODIUM SERPL-SCNC: 140 MMOL/L (ref 136–145)

## 2022-03-03 PROCEDURE — 93005 ELECTROCARDIOGRAM TRACING: CPT

## 2022-03-03 PROCEDURE — 80048 BASIC METABOLIC PNL TOTAL CA: CPT

## 2022-03-03 PROCEDURE — 36415 COLL VENOUS BLD VENIPUNCTURE: CPT

## 2022-03-03 RX ORDER — ATORVASTATIN CALCIUM 10 MG/1
10 TABLET, FILM COATED ORAL
COMMUNITY

## 2022-03-03 RX ORDER — MELATONIN
1000 2 TIMES DAILY
COMMUNITY

## 2022-03-03 NOTE — PERIOP NOTES
[address] [address] [facility]   PRE-ADMISSION TESTING    [phone]     Surgery Date:  3/16/2022 Wednesday      Is surgery arrival time given by surgeon? NO  If NO, [facility] staff will call you between 3 and 7pm the day before your surgery with your arrival time. (If your surgery is on a Monday, we will call you the Friday before.)    Call [phone] after 7pm Monday-Friday if you did not receive this call. INSTRUCTIONS BEFORE YOUR SURGERY   When You  Arrive Arrive at the [address] [facility] on the day of your surgery  Have your insurance card, photo ID, and any copayment (if needed)   Food   and   Drink NO food or drink after midnight the night before surgery    This means NO water, gum, mints, coffee, juice, etc.  No alcohol (beer, wine, liquor) 24 hours before and after surgery   Medications to   TAKE   Morning of Surgery MEDICATIONS TO TAKE THE MORNING OF SURGERY WITH A SIP OF WATER:    Amlodipine   Medications  To  STOP      7 days before surgery  Non-Steroidal anti-inflammatory Drugs (NSAID's): for example, Ibuprofen (Advil, Motrin), Naproxen (Aleve)   Aspirin, if taking for pain    Herbal supplements, vitamins, and fish oil   Other:  (Pain medications not listed above, including Tylenol may be taken)   Medications to Hold  The evening prior to surgery do NOT take your Valsartan. Bathing Clothing  Jewelry  Valuables      If you shower the morning of surgery, please do not apply anything to your skin (lotions, powders, deodorant, or makeup, especially mascara)   Follow Chlorhex[employer] body wash instructions provided to you during PAT appointment. Begin 3 days prior to surgery.    Do not shave or trim anywhere 24 hours before surgery   Wear your hair loose or down; no pony-tails, buns, or metal hair clips   Wear loose, comfortable, clean clothes   Wear glasses instead of contacts  [employer], valuables, and jewelry, including body piercings, at home   If you were given an [employer], bring it on day of surgery. Going Home - or Spending the Night  SAME-DAY SURGERY: You must have a responsible adult drive you home and stay with you 24 hours after surgery   ADMITS: If your doctor is keeping you in the hospital after surgery, leave personal belongings/luggage in your car until you have a hospital room number. Hospital discharge time is 12 noon  Drivers must be here before 12 noon unless you are told differently   Special Instructions It is now mandated that all surgical patients be tested for COVID-19 prior to surgery. Testing has to be exactly 4 days prior to surgery. Your COVID test date is 3/10/2022 between 8:00 am and 11:00 am.     COVID testing will be performed curbside at the [address] [place] [address] Dr byrd. There will be signs leading you to the testing site. You will need to bring a photo ID with you to be swabbed. Patients are advised to self-quarantine at home after testing and prior to your surgery date. You will be notified if your results are positive. What to watch for:   Coronavirus (COVID-19) affects different people in different ways   It also appears with a wide range of symptoms from mild to severe   Signs usually appear 2-14 days after exposure     If you develop any of the following, notify your doctor immediately:  o Fever  o Chills, with or without a shiver  o Muscle pain  o Headache  o Sore throat  o Dry cough  o New loss of taste or smell  o Tiredness      If you develop any of the following, call 911:  o Shortness of breath  o Difficulty breathing  o Chest pain  o New confusion  o Blueness of fingers and/or lips       Follow all instructions so your surgery wont be cancelled. Please, be on time. If a situation occurs and you are delayed the day of surgery, call [phone].     If your physical condition changes (like a fever, cold, flu, etc.) call your surgeon. Home medication(s) reviewed and verified verbally during PAT appointment. The patient was contacted in person. The patient verbalizes understanding of all instructions and does not need reinforcement.

## 2022-03-06 LAB
ATRIAL RATE: 72 BPM
CALCULATED P AXIS, ECG09: 59 DEGREES
CALCULATED R AXIS, ECG10: 33 DEGREES
CALCULATED T AXIS, ECG11: 31 DEGREES
DIAGNOSIS, 93000: NORMAL
P-R INTERVAL, ECG05: 172 MS
Q-T INTERVAL, ECG07: 382 MS
QRS DURATION, ECG06: 90 MS
QTC CALCULATION (BEZET), ECG08: 418 MS
VENTRICULAR RATE, ECG03: 72 BPM

## 2022-03-10 ENCOUNTER — HOSPITAL ENCOUNTER (OUTPATIENT)
Dept: PREADMISSION TESTING | Age: 82
Discharge: HOME | End: 2022-03-10
Payer: MEDICARE

## 2022-03-10 PROCEDURE — U0005 INFEC AGEN DETEC AMPLI PROBE: HCPCS

## 2022-03-11 LAB
SARS-COV-2, XPLCVT: NOT DETECTED
SOURCE, COVRS: NORMAL

## 2022-03-15 ENCOUNTER — ANESTHESIA EVENT (OUTPATIENT)
Dept: SURGERY | Age: 82
End: 2022-03-15
Payer: MEDICARE

## 2022-03-16 ENCOUNTER — ANESTHESIA (OUTPATIENT)
Dept: SURGERY | Age: 82
End: 2022-03-16
Payer: MEDICARE

## 2022-03-16 ENCOUNTER — HOSPITAL ENCOUNTER (OUTPATIENT)
Age: 82
Setting detail: OUTPATIENT SURGERY
Discharge: HOME | End: 2022-03-16
Attending: ORTHOPAEDIC SURGERY | Admitting: ORTHOPAEDIC SURGERY
Payer: MEDICARE

## 2022-03-16 VITALS
RESPIRATION RATE: 19 BRPM | TEMPERATURE: 97.4 F | HEIGHT: 70 IN | SYSTOLIC BLOOD PRESSURE: 115 MMHG | OXYGEN SATURATION: 96 % | DIASTOLIC BLOOD PRESSURE: 76 MMHG | HEART RATE: 75 BPM | BODY MASS INDEX: 25.31 KG/M2 | WEIGHT: 176.81 LBS

## 2022-03-16 PROCEDURE — 77030002916 HC SUT ETHLN J&J -A: Performed by: ORTHOPAEDIC SURGERY

## 2022-03-16 PROCEDURE — 74011000250 HC RX REV CODE- 250: Performed by: ORTHOPAEDIC SURGERY

## 2022-03-16 PROCEDURE — 77030002922 HC SUT FBRWRE ARTH -B: Performed by: ORTHOPAEDIC SURGERY

## 2022-03-16 PROCEDURE — 76060000063 HC AMB SURG ANES 1.5 TO 2 HR: Performed by: ORTHOPAEDIC SURGERY

## 2022-03-16 PROCEDURE — 77030002966 HC SUT PDS J&J -A: Performed by: ORTHOPAEDIC SURGERY

## 2022-03-16 PROCEDURE — 74011250636 HC RX REV CODE- 250/636: Performed by: ANESTHESIOLOGY

## 2022-03-16 PROCEDURE — 76030000003 HC AMB SURG OR TIME 1.5 TO 2: Performed by: ORTHOPAEDIC SURGERY

## 2022-03-16 PROCEDURE — 74011000272 HC RX REV CODE- 272: Performed by: ORTHOPAEDIC SURGERY

## 2022-03-16 PROCEDURE — 77030003601 HC NDL NRV BLK BBMI -A

## 2022-03-16 PROCEDURE — 77030006773 HC BLD SAW OSC BRSM -A: Performed by: ORTHOPAEDIC SURGERY

## 2022-03-16 PROCEDURE — 77030006690 HC BLD OPHTH BVR BD -B: Performed by: ORTHOPAEDIC SURGERY

## 2022-03-16 PROCEDURE — 77030040361 HC SLV COMPR DVT MDII -B

## 2022-03-16 PROCEDURE — 77030040922 HC BLNKT HYPOTHRM STRY -A

## 2022-03-16 PROCEDURE — 2709999900 HC NON-CHARGEABLE SUPPLY: Performed by: ORTHOPAEDIC SURGERY

## 2022-03-16 PROCEDURE — 77030006689 HC BLD OPHTH BVR BD -A: Performed by: ORTHOPAEDIC SURGERY

## 2022-03-16 PROCEDURE — 76210000050 HC AMBSU PH II REC 0.5 TO 1 HR: Performed by: ORTHOPAEDIC SURGERY

## 2022-03-16 PROCEDURE — 77030002996 HC SUT SLK J&J -A: Performed by: ORTHOPAEDIC SURGERY

## 2022-03-16 PROCEDURE — 77030000032 HC CUF TRNQT ZIMM -B: Performed by: ORTHOPAEDIC SURGERY

## 2022-03-16 PROCEDURE — 74011000250 HC RX REV CODE- 250: Performed by: ANESTHESIOLOGY

## 2022-03-16 PROCEDURE — 76210000034 HC AMBSU PH I REC 0.5 TO 1 HR: Performed by: ORTHOPAEDIC SURGERY

## 2022-03-16 RX ORDER — NALOXONE HYDROCHLORIDE 0.4 MG/ML
0.2 INJECTION, SOLUTION INTRAMUSCULAR; INTRAVENOUS; SUBCUTANEOUS
Status: DISCONTINUED | OUTPATIENT
Start: 2022-03-16 | End: 2022-03-16 | Stop reason: HOSPADM

## 2022-03-16 RX ORDER — FLUMAZENIL 0.1 MG/ML
0.2 INJECTION INTRAVENOUS
Status: DISCONTINUED | OUTPATIENT
Start: 2022-03-16 | End: 2022-03-16 | Stop reason: HOSPADM

## 2022-03-16 RX ORDER — SODIUM CHLORIDE, SODIUM LACTATE, POTASSIUM CHLORIDE, CALCIUM CHLORIDE 600; 310; 30; 20 MG/100ML; MG/100ML; MG/100ML; MG/100ML
125 INJECTION, SOLUTION INTRAVENOUS CONTINUOUS
Status: DISCONTINUED | OUTPATIENT
Start: 2022-03-16 | End: 2022-03-16 | Stop reason: HOSPADM

## 2022-03-16 RX ORDER — LIDOCAINE HYDROCHLORIDE 10 MG/ML
0.1 INJECTION, SOLUTION EPIDURAL; INFILTRATION; INTRACAUDAL; PERINEURAL AS NEEDED
Status: DISCONTINUED | OUTPATIENT
Start: 2022-03-16 | End: 2022-03-16 | Stop reason: HOSPADM

## 2022-03-16 RX ORDER — PROPOFOL 10 MG/ML
INJECTION, EMULSION INTRAVENOUS
Status: DISCONTINUED | OUTPATIENT
Start: 2022-03-16 | End: 2022-03-16 | Stop reason: HOSPADM

## 2022-03-16 RX ORDER — SODIUM CHLORIDE 0.9 % (FLUSH) 0.9 %
5-40 SYRINGE (ML) INJECTION AS NEEDED
Status: DISCONTINUED | OUTPATIENT
Start: 2022-03-16 | End: 2022-03-16 | Stop reason: HOSPADM

## 2022-03-16 RX ORDER — CEFAZOLIN SODIUM 1 G/3ML
INJECTION, POWDER, FOR SOLUTION INTRAMUSCULAR; INTRAVENOUS AS NEEDED
Status: DISCONTINUED | OUTPATIENT
Start: 2022-03-16 | End: 2022-03-16 | Stop reason: HOSPADM

## 2022-03-16 RX ORDER — PROPOFOL 10 MG/ML
INJECTION, EMULSION INTRAVENOUS AS NEEDED
Status: DISCONTINUED | OUTPATIENT
Start: 2022-03-16 | End: 2022-03-16 | Stop reason: HOSPADM

## 2022-03-16 RX ORDER — MIDAZOLAM HYDROCHLORIDE 1 MG/ML
INJECTION, SOLUTION INTRAMUSCULAR; INTRAVENOUS AS NEEDED
Status: DISCONTINUED | OUTPATIENT
Start: 2022-03-16 | End: 2022-03-16 | Stop reason: HOSPADM

## 2022-03-16 RX ORDER — ONDANSETRON 2 MG/ML
4 INJECTION INTRAMUSCULAR; INTRAVENOUS AS NEEDED
Status: DISCONTINUED | OUTPATIENT
Start: 2022-03-16 | End: 2022-03-16 | Stop reason: HOSPADM

## 2022-03-16 RX ORDER — HYDROMORPHONE HYDROCHLORIDE 1 MG/ML
.5-1 INJECTION, SOLUTION INTRAMUSCULAR; INTRAVENOUS; SUBCUTANEOUS
Status: DISCONTINUED | OUTPATIENT
Start: 2022-03-16 | End: 2022-03-16 | Stop reason: HOSPADM

## 2022-03-16 RX ORDER — SODIUM CHLORIDE 0.9 % (FLUSH) 0.9 %
5-40 SYRINGE (ML) INJECTION EVERY 8 HOURS
Status: DISCONTINUED | OUTPATIENT
Start: 2022-03-16 | End: 2022-03-16 | Stop reason: HOSPADM

## 2022-03-16 RX ORDER — SODIUM CHLORIDE, SODIUM LACTATE, POTASSIUM CHLORIDE, CALCIUM CHLORIDE 600; 310; 30; 20 MG/100ML; MG/100ML; MG/100ML; MG/100ML
100 INJECTION, SOLUTION INTRAVENOUS CONTINUOUS
Status: DISCONTINUED | OUTPATIENT
Start: 2022-03-16 | End: 2022-03-16 | Stop reason: HOSPADM

## 2022-03-16 RX ORDER — ROPIVACAINE HYDROCHLORIDE 5 MG/ML
INJECTION, SOLUTION EPIDURAL; INFILTRATION; PERINEURAL AS NEEDED
Status: DISCONTINUED | OUTPATIENT
Start: 2022-03-16 | End: 2022-03-16 | Stop reason: HOSPADM

## 2022-03-16 RX ORDER — EPHEDRINE SULFATE/0.9% NACL/PF 50 MG/5 ML
SYRINGE (ML) INTRAVENOUS AS NEEDED
Status: DISCONTINUED | OUTPATIENT
Start: 2022-03-16 | End: 2022-03-16 | Stop reason: HOSPADM

## 2022-03-16 RX ORDER — FENTANYL CITRATE 50 UG/ML
INJECTION, SOLUTION INTRAMUSCULAR; INTRAVENOUS AS NEEDED
Status: DISCONTINUED | OUTPATIENT
Start: 2022-03-16 | End: 2022-03-16 | Stop reason: HOSPADM

## 2022-03-16 RX ORDER — LIDOCAINE HYDROCHLORIDE 20 MG/ML
INJECTION, SOLUTION INFILTRATION; PERINEURAL AS NEEDED
Status: DISCONTINUED | OUTPATIENT
Start: 2022-03-16 | End: 2022-03-16 | Stop reason: HOSPADM

## 2022-03-16 RX ADMIN — SODIUM CHLORIDE, POTASSIUM CHLORIDE, SODIUM LACTATE AND CALCIUM CHLORIDE 125 ML/HR: 600; 310; 30; 20 INJECTION, SOLUTION INTRAVENOUS at 11:30

## 2022-03-16 RX ADMIN — CEFAZOLIN SODIUM 2 G: 1 POWDER, FOR SOLUTION INTRAMUSCULAR; INTRAVENOUS at 09:45

## 2022-03-16 RX ADMIN — SODIUM CHLORIDE, POTASSIUM CHLORIDE, SODIUM LACTATE AND CALCIUM CHLORIDE 125 ML/HR: 600; 310; 30; 20 INJECTION, SOLUTION INTRAVENOUS at 07:32

## 2022-03-16 RX ADMIN — LIDOCAINE HYDROCHLORIDE 100 MG: 20 INJECTION, SOLUTION INFILTRATION; PERINEURAL at 09:35

## 2022-03-16 RX ADMIN — PROPOFOL 80 MCG/KG/MIN: 10 INJECTION, EMULSION INTRAVENOUS at 09:35

## 2022-03-16 RX ADMIN — SODIUM CHLORIDE, POTASSIUM CHLORIDE, SODIUM LACTATE AND CALCIUM CHLORIDE: 600; 310; 30; 20 INJECTION, SOLUTION INTRAVENOUS at 10:22

## 2022-03-16 RX ADMIN — PROPOFOL 20 MG: 10 INJECTION, EMULSION INTRAVENOUS at 09:42

## 2022-03-16 RX ADMIN — ROPIVACAINE HYDROCHLORIDE 30 ML: 5 INJECTION, SOLUTION EPIDURAL; INFILTRATION; PERINEURAL at 08:29

## 2022-03-16 RX ADMIN — MIDAZOLAM 2 MG: 1 INJECTION, SOLUTION INTRAMUSCULAR; INTRAVENOUS at 09:31

## 2022-03-16 RX ADMIN — Medication 10 MG: at 10:10

## 2022-03-16 RX ADMIN — Medication 5 MG: at 10:05

## 2022-03-16 RX ADMIN — FENTANYL CITRATE 100 MCG: 50 INJECTION, SOLUTION INTRAMUSCULAR; INTRAVENOUS at 08:24

## 2022-03-16 NOTE — DISCHARGE INSTRUCTIONS
DISCHARGE SUMMARY from your Nurse      PATIENT INSTRUCTIONS    After general anesthesia or intravenous sedation, for 24 hours or while taking prescription Narcotics:  · Limit your activities  · Do not drive and operate hazardous machinery  · Do not make important personal or business decisions  · Do  not drink alcoholic beverages  · If you have not urinated within 8 hours after discharge, please contact your surgeon on call. Report the following to your surgeon:  · Excessive pain, swelling, redness or odor of or around the surgical area  · Temperature over 100.5  · Nausea and vomiting lasting longer than 4 hours or if unable to take medications  · Any signs of decreased circulation or nerve impairment to extremity: change in color, persistent  numbness, tingling, coldness or increase pain  · Any questions      GOOD HELP TO FIGHT AN INFECTION  Here are a few tip to help reduce the chance of getting an infection after surgery:   Wash Your Hands   Good handwashing is the most important thing you and your caregiver can do.  Wash before and after caring for any wounds. Dry your hand with a clean towel.  Wash with soap and water for at least 20 seconds. A TIP: sing the \"Happy Birthday\" song through one time while washing to help with the timing.  Use a hand  in between washings.  Shower   When your surgeon says it is OK to take a shower, use a new bar of antibacterial soap (if that is what you use, and keep that bar of soap ONLY for your use), or antibacterial body wash.  Use a clean wash cloth or sponge when you bathe.  Dry off with a clean towel  after every bath - be careful around any wounds, skin staples, sutures or surgical glue over/on wounds.  Do not enter swimming pools, hot tubs, lakes, rivers and/or ocean until wounds are healed and your doctor/surgeon says it is OK.  Use Clean Sheets   Sleep on freshly laundered sheets after your surgery.    Keep the surgery site covered with a clean, dry bandage (if instructed to do so). If the bandage becomes soiled, reapply a new, dry, clean bandage.  Do not allow pets to sleep with you while your wound is healing.  Lifestyle Modification and Controlling Your Blood Sugar   Smoking slows wound healing. Stop smoking and limit exposure to second-hand smoke.  High blood sugar slows wound healing. Eat a well-balanced diet to provide proper nutrition while healing   Monitor your blood sugar (if you are a diabetic) and take your medications as you are suppose to so you can control you blood sugar after surgery. COUGH AND DEEP BREATHE    Breathing deeply and coughing are very important exercises to do after surgery. Deep breathing and coughing open the little air tubes and air sacks in your lungs. You take deep breaths every day. You may not even notice - it is just something you do when you sigh or yawn. It is a natural exercise you do to keep these air passages open. After surgery, take deep breaths and cough, on purpose. DIRECTIONS:  · Take 10 to 15 slow deep breaths every hour while awake. · Breathe in deeply, and hold it for 2 seconds. · Exhale slowly through puckered lips, like blowing up a balloon. · After every 4th or 5th deep breath, hug your pillow to your chest or belly and give a hard, deep cough. Yes, it will probably hurt. But doing this exercise is a very important part of healing after surgery. Take your pain medicine to help you do this exercise without too much pain. Coughing and deep breathing help prevent bronchitis and pneumonia after surgery. If you had chest or belly surgery, use a pillow as a \"hug jesus manuel\" and hold it tightly to your chest or belly when you cough. ANKLE PUMPS    Ankle pumps increase the circulation of oxygenated blood to your lower extremities and decrease your risk for circulation problems such as blood clots.  They also stretch the muscles, tendons and ligaments in your foot and ankle, and prevent joint contracture in the ankle and foot, especially after surgeries on the legs. It is important to do ankle pump exercises regularly after surgery because immobility increases your risk for developing a blood clot. Your doctor may also have you take an Aspirin for the next few days as well. If your doctor did not ask you to take an Aspirin, consult with him before starting Aspirin therapy on your own. The exercise is quite simple. · Slowly point your foot forward, feeling the muscles on the top of your lower leg stretch, and hold this position for 5 seconds. · Next, pull your foot back toward you as far as possible, stretching the calf muscles, and hold that position for 5 seconds. · Repeat with the other foot. · Perform 10 repetitions every hour while awake for both ankles if possible (down and then up with the foot once is one repetition). You should feel gentle stretching of the muscles in your lower leg when doing this exercise. If you feel pain, or your range of motion is limited, don't push too hard. Only go the limit your joint and muscles will let you go. If you have increasing pain, progressively worsening leg warmth or swelling, STOP the exercise and call your doctor. MEDICATION AND   SIDE EFFECT GUIDE    The Audra Jesus MEDICATION AND SIDE EFFECT GUIDE was provided to the PATIENT AND CARE PROVIDER. Information provided includes instruction about drug purpose and common side effects for the following medications:   · ***        These are general instructions for a healthy lifestyle:    *   Please give a list of your current medications to your Primary Care Provider. *   Please update this list whenever your medications are discontinued, doses are changed, or new medications (including over-the-counter products) are added.   *   Please carry medication information at all times in case of emergency situations. About Smoking  No smoking / No tobacco products  Avoid exposure to second hand smoke     Surgeon General's Warning:  Quitting smoking now greatly reduces serious risk to your health. Obesity, smoking, and sedentary lifestyle greatly increases your risk for illness and disease. A healthy diet, regular physical exercise & weight monitoring are important for maintaining a healthy lifestyle. Congestive Heart Failure  You may be retaining fluid if you have a history of heart failure or if you experience any of the following symptoms:  Weight gain of 3 pounds or more overnight or 5 pounds in a week, increased swelling in your hands or feet or shortness of breath while lying flat in bed. Please call your doctor as soon as you notice any of these symptoms; do not wait until your next office visit. Recognize signs and symptoms of STROKE:  F -  Face looks uneven  A -  Arms unable to move or move evenly  S -  Speech slurred or non-existent  T -  Time-call 911 as soon as signs and symptoms begin-DO NOT go          back to bed or wait to see if you get better-TIME IS BRAIN. Warning Signs of HEART ATTACK   Call 911 if you have these symptoms:     Chest discomfort. Most heart attacks involve discomfort in the center of the chest that lasts more than a few minutes, or that goes away and comes back. It can feel like uncomfortable pressure, squeezing, fullness, or pain.  Discomfort in other areas of the upper body. Symptoms can include pain or discomfort in one or both arms, the back, neck, jaw, or stomach.  Shortness of breath with or without chest discomfort.  Other signs may include breaking out in a cold sweat, nausea, or lightheadedness. Don't wait more than five minutes to call 911 - MINUTES MATTER! Fast action can save your life. Calling 911 is almost always the fastest way to get lifesaving treatment.  Emergency Medical Services staff can begin treatment when they arrive -- up to an hour sooner than if someone gets to the hospital by car. Learning About Coronavirus [phone])  Coronavirus [phone]): Overview  What is coronavirus (COVID-19)? The coronavirus disease (COVID-19) is caused by a virus. It is an illness that was first found in [place], [place], in December 2019. It has since spread worldwide. The virus can cause fever, cough, and trouble breathing. In severe cases, it can cause pneumonia and make it hard to breathe without help. It can cause death. Coronaviruses are a large group of viruses. They cause the common cold. They also cause more serious illnesses like Middle East respiratory syndrome (MERS) and severe acute respiratory syndrome (SARS). COVID-19 is caused by a novel coronavirus. That means it's a new type that has not been seen in people before. This virus spreads person-to-person through droplets from coughing and sneezing. It can also spread when you are close to someone who is infected. And it can spread when you touch something that has the virus on it, such as a doorknob or a tabletop. What can you do to protect yourself from coronavirus (COVID-19)? The best way to protect yourself from getting sick is to:  · Avoid areas where there is an outbreak. · Avoid contact with people who may be infected. · Wash your hands often with soap or alcohol-based hand sanitizers. · Avoid crowds and try to stay at least 6 feet away from other people. · Wash your hands often, especially after you cough or sneeze. Use soap and water, and scrub for at least 20 seconds. If soap and water aren't available, use an alcohol-based hand . · Avoid touching your mouth, nose, and eyes. What can you do to avoid spreading the virus to others? To help avoid spreading the virus to others:  · Cover your mouth with a tissue when you cough or sneeze. Then throw the tissue in the trash. · Use a disinfectant to clean things that you touch often.   · Stay home if you are sick or have been exposed to the virus. Don't go to school, work, or public areas. And don't use public transportation. · If you are sick:  ? Leave your home only if you need to get medical care. But call the doctor's office first so they know you're coming. And wear a face mask, if you have one.  ? If you have a face mask, wear it whenever you're around other people. It can help stop the spread of the virus when you cough or sneeze. ? Clean and disinfect your home every day. Use household  and disinfectant wipes or sprays. Take special care to clean things that you grab with your hands. These include doorknobs, remote controls, phones, and handles on your refrigerator and microwave. And don't forget countertops, tabletops, bathrooms, and computer keyboards. When to call for help  Call 911 anytime you think you may need emergency care. For example, call if:  · You have severe trouble breathing. (You can't talk at all.)  · You have constant chest pain or pressure. · You are severely dizzy or lightheaded. · You are confused or can't think clearly. · Your face and lips have a blue color. · You pass out (lose consciousness) or are very hard to wake up. Call your doctor now if you develop symptoms such as:  · Shortness of breath. · Fever. · Cough. If you need to get care, call ahead to the doctor's office for instructions before you go. Make sure you wear a face mask, if you have one, to prevent exposing other people to the virus. Where can you get the latest information? The following health organizations are tracking and studying this virus. Their websites contain the most up-to-date information. Doyle Banegas also learn what to do if you think you may have been exposed to the virus. · U.[place]. Centers for Disease Control and Prevention (CDC): The CDC provides updated news about the disease and travel advice. The website also tells you how to prevent the spread of infection.  www.cdc.gov  · [address] Organization (WHO): WHO offers information about the virus outbreaks. WHO also has travel advice. www.who.int  Current as of: April 1, 2020               Content Version: 12.4  © 2006-2020 [employer]. Care instructions adapted under license by your healthcare professional. If you have questions about a medical condition or this instruction, always ask your healthcare professional. [address] any warranty or liability for your use of this information. The discharge information has been reviewed with the {PATIENT PARENT GUARDIAN:39463}. Any questions and concerns from the {PATIENT PARENT GUARDIAN:49512} have been addressed. The {PATIENT PARENT GUARDIAN:98506} verbalized understanding. CONTENTS FOUND IN YOUR DISCHARGE ENVELOPE:  [x]     Surgeon and Hospital Discharge Instructions  [x]     [facility] Care Provider Card  []     Medication & Side Effect Guide            (your newly prescribed medications have been marked/highlighted showing the most common side effects from   the classes of drugs on your prescriptions)  []     Medication Prescription(s) x *** ( [] These have been sent electronically to your pharmacy by your surgeon,   - OR -       your surgeon has already provided these to you during a previous/pre-op office visit)  []     [address]  []     Physical Therapy Prescription  []     Follow-up Appointment Cards  []     Surgery-related Pictures/Media  []     Pain block and/or block with On-Q Catheter from Anesthesia Service (information included in your instructions above)  []     Medical device information sheets/pamphlets from their    []     School/work excuse note. []     /parent work excuse note.       The following personal items collected during your admission are returned to you:   Dental Appliance: Dental Appliances: None  Vision: Visual Aid: Glasses  Hearing Aid:    Jewelry: Jewelry: None  Clothing: Clothing: Footwear,Jacket/Coat,Pants  Other Valuables:  Other Valuables: None  Valuables sent to safe:

## 2022-03-16 NOTE — ANESTHESIA POSTPROCEDURE EVALUATION
Procedure(s):  LEFT THUMB CARPOMETACARPAL ARTHROPLASTY WITH EVALUATION OF FLEXOR CARPI RADIALIS (FCR) TENDON (REGIONAL BLOCK). regional    Anesthesia Post Evaluation        Patient location during evaluation: PACU  Patient participation: complete - patient participated  Level of consciousness: awake  Pain management: adequate  Airway patency: patent  Anesthetic complications: no  Cardiovascular status: acceptable and stable  Respiratory status: acceptable and unassisted  Hydration status: acceptable  Comments: The patient was seen and evaluated in the post-operative period. The time of my evaluation may not match the time of this note. The patient denied uncontrolled pain or nausea, and there were no significant complications evident. Kate Arcos MD      Post anesthesia nausea and vomiting:  none  Final Post Anesthesia Temperature Assessment:  Normothermia (36.0-37.5 degrees C)      INITIAL Post-op Vital signs:   Vitals Value Taken Time   BP     Temp     Pulse 68 03/16/22 1105   Resp 9 03/16/22 1105   SpO2 99 % 03/16/22 1105   Vitals shown include unvalidated device data.

## 2022-03-16 NOTE — ANESTHESIA PREPROCEDURE EVALUATION
Relevant Problems   No relevant active problems       Anesthetic History   No history of anesthetic complications            Review of Systems / Medical History  Patient summary reviewed, nursing notes reviewed and pertinent labs reviewed    Pulmonary  Within defined limits                 Neuro/Psych   Within defined limits           Cardiovascular    Hypertension                   GI/Hepatic/Renal  Within defined limits              Endo/Other  Within defined limits           Other Findings              Physical Exam    Airway  Mallampati: II  TM Distance: 4 - 6 cm  Neck ROM: normal range of motion   Mouth opening: Normal     Cardiovascular    Rhythm: regular  Rate: normal         Dental  No notable dental hx       Pulmonary  Breath sounds clear to auscultation               Abdominal         Other Findings            Anesthetic Plan    ASA: 2  Anesthesia type: regional - brachial plexus block            Anesthetic plan and risks discussed with: Patient

## 2022-03-16 NOTE — ANESTHESIA PROCEDURE NOTES
Peripheral Block    Start time: 3/16/2022 8:24 AM  End time: 3/16/2022 8:29 AM  Performed by: Brie Cooper MD  Authorized by: Brie Cooper MD       Pre-procedure:    Indications: at surgeon's request and primary anesthetic    Preanesthetic Checklist: patient identified, risks and benefits discussed, site marked, timeout performed, anesthesia consent given and patient being monitored    Timeout Time: 08:24 EDT          Block Type:   Block Type:  Supraclavicular  Laterality:  Left  Monitoring:  Continuous pulse ox, frequent vital sign checks, heart rate, responsive to questions and oxygen  Injection Technique:  Single shot  Procedures: ultrasound guided    Patient Position: supine  Prep: chlorhexidine    Location:  Supraclavicular  Needle Type:  Stimuplex  Needle Gauge:  22 G  Needle Localization:  Anatomical landmarks and ultrasound guidance    Assessment:  Number of attempts:  1  Injection Assessment:  Incremental injection every 5 mL, local visualized surrounding nerve on ultrasound, negative aspiration for blood, no paresthesia and no intravascular symptoms  Patient tolerance:  Patient tolerated the procedure well with no immediate complications
unable to assess

## 2022-03-19 PROBLEM — M19.012 OSTEOARTHRITIS OF LEFT SHOULDER: Status: ACTIVE | Noted: 2018-02-12

## 2023-03-20 ENCOUNTER — HOSPITAL ENCOUNTER (OUTPATIENT)
Dept: PREADMISSION TESTING | Age: 83
Discharge: HOME | End: 2023-03-20
Payer: MEDICARE

## 2023-03-20 VITALS
BODY MASS INDEX: 25.56 KG/M2 | SYSTOLIC BLOOD PRESSURE: 163 MMHG | RESPIRATION RATE: 16 BRPM | HEART RATE: 73 BPM | WEIGHT: 178.57 LBS | TEMPERATURE: 97.1 F | DIASTOLIC BLOOD PRESSURE: 77 MMHG | HEIGHT: 70 IN | OXYGEN SATURATION: 99 %

## 2023-03-20 LAB
ABO + RH BLD: NORMAL
ALBUMIN SERPL-MCNC: 4.4 G/DL (ref 3.5–5)
ALBUMIN/GLOB SERPL: 1.5 (ref 1.1–2.2)
ALP SERPL-CCNC: 85 U/L (ref 45–117)
ALT SERPL-CCNC: 42 U/L (ref 12–78)
ANION GAP SERPL CALC-SCNC: 5 MMOL/L (ref 5–15)
APPEARANCE UR: CLEAR
APTT PPP: 29.1 SEC (ref 22.1–31)
AST SERPL-CCNC: 21 U/L (ref 15–37)
BACTERIA URNS QL MICRO: NEGATIVE /HPF
BASOPHILS # BLD: 0.1 K/UL (ref 0–0.1)
BASOPHILS NFR BLD: 1 % (ref 0–1)
BILIRUB SERPL-MCNC: 0.6 MG/DL (ref 0.2–1)
BILIRUB UR QL: NEGATIVE
BLOOD GROUP ANTIBODIES SERPL: NORMAL
BUN SERPL-MCNC: 19 MG/DL (ref 6–20)
BUN/CREAT SERPL: 20 (ref 12–20)
CALCIUM SERPL-MCNC: 9.1 MG/DL (ref 8.5–10.1)
CHLORIDE SERPL-SCNC: 105 MMOL/L (ref 97–108)
CO2 SERPL-SCNC: 31 MMOL/L (ref 21–32)
COLOR UR: NORMAL
CREAT SERPL-MCNC: 0.95 MG/DL (ref 0.7–1.3)
DIFFERENTIAL METHOD BLD: NORMAL
EOSINOPHIL # BLD: 0.2 K/UL (ref 0–0.4)
EOSINOPHIL NFR BLD: 3 % (ref 0–7)
EPITH CASTS URNS QL MICRO: NORMAL /LPF
ERYTHROCYTE [DISTWIDTH] IN BLOOD BY AUTOMATED COUNT: 13.5 % (ref 11.5–14.5)
EST. AVERAGE GLUCOSE BLD GHB EST-MCNC: 111 MG/DL
GLOBULIN SER CALC-MCNC: 2.9 G/DL (ref 2–4)
GLUCOSE SERPL-MCNC: 97 MG/DL (ref 65–100)
GLUCOSE UR STRIP.AUTO-MCNC: NEGATIVE MG/DL
HBA1C MFR BLD: 5.5 % (ref 4–5.6)
HCT VFR BLD AUTO: 40.5 % (ref 36.6–50.3)
HGB BLD-MCNC: 13.4 G/DL (ref 12.1–17)
HGB UR QL STRIP: NEGATIVE
HYALINE CASTS URNS QL MICRO: NORMAL /LPF (ref 0–2)
IMM GRANULOCYTES # BLD AUTO: 0 K/UL (ref 0–0.04)
IMM GRANULOCYTES NFR BLD AUTO: 0 % (ref 0–0.5)
INR PPP: 1 (ref 0.9–1.1)
KETONES UR QL STRIP.AUTO: NEGATIVE MG/DL
LEUKOCYTE ESTERASE UR QL STRIP.AUTO: NEGATIVE
LYMPHOCYTES # BLD: 1.9 K/UL (ref 0.8–3.5)
LYMPHOCYTES NFR BLD: 29 % (ref 12–49)
MCH RBC QN AUTO: 30 PG (ref 26–34)
MCHC RBC AUTO-ENTMCNC: 33.1 G/DL (ref 30–36.5)
MCV RBC AUTO: 90.8 FL (ref 80–99)
MONOCYTES # BLD: 0.7 K/UL (ref 0–1)
MONOCYTES NFR BLD: 11 % (ref 5–13)
NEUTS SEG # BLD: 3.7 K/UL (ref 1.8–8)
NEUTS SEG NFR BLD: 56 % (ref 32–75)
NITRITE UR QL STRIP.AUTO: NEGATIVE
NRBC # BLD: 0 K/UL (ref 0–0.01)
NRBC BLD-RTO: 0 PER 100 WBC
PH UR STRIP: 6 (ref 5–8)
PLATELET # BLD AUTO: 180 K/UL (ref 150–400)
PMV BLD AUTO: 9.4 FL (ref 8.9–12.9)
POTASSIUM SERPL-SCNC: 3.8 MMOL/L (ref 3.5–5.1)
PROT SERPL-MCNC: 7.3 G/DL (ref 6.4–8.2)
PROT UR STRIP-MCNC: NEGATIVE MG/DL
PROTHROMBIN TIME: 10.9 SEC (ref 9–11.1)
RBC # BLD AUTO: 4.46 M/UL (ref 4.1–5.7)
RBC #/AREA URNS HPF: NORMAL /HPF (ref 0–5)
SODIUM SERPL-SCNC: 141 MMOL/L (ref 136–145)
SP GR UR REFRACTOMETRY: 1.01 (ref 1–1.03)
SPECIMEN EXP DATE BLD: NORMAL
THERAPEUTIC RANGE,PTTT: NORMAL SECS (ref 58–77)
UA: UC IF INDICATED,UAUC: NORMAL
UROBILINOGEN UR QL STRIP.AUTO: 0.2 EU/DL (ref 0.2–1)
WBC # BLD AUTO: 6.5 K/UL (ref 4.1–11.1)
WBC URNS QL MICRO: NORMAL /HPF (ref 0–4)

## 2023-03-20 PROCEDURE — 86900 BLOOD TYPING SEROLOGIC ABO: CPT

## 2023-03-20 PROCEDURE — 80053 COMPREHEN METABOLIC PANEL: CPT

## 2023-03-20 PROCEDURE — 81001 URINALYSIS AUTO W/SCOPE: CPT

## 2023-03-20 PROCEDURE — 85610 PROTHROMBIN TIME: CPT

## 2023-03-20 PROCEDURE — 83036 HEMOGLOBIN GLYCOSYLATED A1C: CPT

## 2023-03-20 PROCEDURE — 85025 COMPLETE CBC W/AUTO DIFF WBC: CPT

## 2023-03-20 PROCEDURE — 36415 COLL VENOUS BLD VENIPUNCTURE: CPT

## 2023-03-20 PROCEDURE — 85730 THROMBOPLASTIN TIME PARTIAL: CPT

## 2023-03-20 NOTE — H&P
Trisha Jonesenrique was referred for evaluation by:Dr. Mike Stevens for Pre- Op Evaluation. Please see encounter details and orders for consultative summary. Type of surgery : Right reverse shoulder arthroplasty with biceps tenodesis and axillary dissection  Surgery site : Right shoulder  Anesthesia type: General  Date of procedure:  4/3/2023    This 82y.o. year old male presents with complaints of right shoulder pain and decrease in range of motion for years. Conservative measures including medication management and physical therapy have been exhausted prior to the decision for surgery. Patient has discussed the risks, alternatives, and benefits of the surgery with surgeon and has elected to proceed with surgical intervention. Sees Dr Frida Black for Aortic valve stenosis and has an appointment for surgical clearance tomorrow. Allergies: No Known Allergies  Latex allergy: no  Prior reactions to anesthesia:  \"convulsions\" after leg surgery in [facility]; no issues with subsequent surgeries     Current Outpatient Medications   Medication Sig    cholecalciferol (VITAMIN D3) (1000 Units /25 mcg) tablet Take 1,000 Units by mouth two (2) times a day. atorvastatin (LIPITOR) 10 mg tablet Take 10 mg by mouth nightly. VIT A/VIT C/VIT E/ZINC/COPPER (PRESERVISION AREDS PO) Take 1 Tab by mouth two (2) times a day. valsartan (DIOVAN) 320 mg tablet Take 320 mg by mouth every evening. No current facility-administered medications for this encounter.      Past Medical History:   Diagnosis Date    Aortic valve stenosis     Arthritis     Cataracts, bilateral     Chronic back pain     COVID-19 08/2021    History of blood transfusion 1966, 2011    Right leg injury while in [facility]; anemia after knee replacement revision    Hypercholesteremia     Hypertension     Macular degeneration     one wet and one dry    Psychiatric disorder     anxiety    Tinnitus     Unspecified adverse effect of anesthesia 1966    \"Convulsions\" after leg surgeries ([address] injury)     Past Surgical History:   Procedure Laterality Date    HX ADENOIDECTOMY      HX CARPAL TUNNEL RELEASE Left 03/2015    Left carpal tunnel and cubital tunnel release    HX COLONOSCOPY      HX ENDOSCOPY      2017, 2020    HX HEART CATHETERIZATION  2004    normal    HX KNEE ARTHROSCOPY Left 01/2015    HX KNEE REPLACEMENT Right 1997    HX KNEE REPLACEMENT Right     revision 2011, 2017    HX KNEE REPLACEMENT Left 11/2015    HX ORTHOPAEDIC  1966    right leg-x 10 surgeries [facility] injury    HX ORTHOPAEDIC Left 03/2022    thumb surgery    HX REFRACTIVE SURGERY Bilateral     Left 2018, Right 2019    HX SHOULDER REPLACEMENT Left 02/2018     Social History     Tobacco Use    Smoking status: Never    Smokeless tobacco: Never   Vaping Use    Vaping Use: Never used   Substance Use Topics    Alcohol use: Yes     Alcohol/week: 2.0 standard drinks     Types: 2 Glasses of wine per week     Comment:      Drug use: No     Family History   Problem Relation Age of Onset    Cancer Mother     Other Mother         polio in infancy    Hypertension Father     Heart Disease Father     No Known Problems Sister     No Known Problems Brother     Cancer Sister         panceratic    No Known Problems Sister         Pre-Operative Risk Assessment Using 2014 ACC/AHA Guidelines     Emergent procedure: No  Active Cardiac Condition including decompensated HF, Arrhythmia, MI <3 weeks, severe valve disease: No  Risk Level of Procedure: Intermediate Risk (intraperitoneal, intrathoracic, HENT, orthopedic, or carotid endarterectomy, etc.)  Revised Cardiac Risk Index Risk factors: None  % risk of cardiac complications during or around noncardiac surgery (30 day risk of Death, MI or Cardiac arrest).   Measurement of Exercise Tolerance before Surgery >4 METS (climbing > 1 flight of stairs without stopping, walking up hill > 1-2 blocks, scrubbing floors, moving furniture, golf, bowling, dancing or tennis, or running short distance): Yes    History of cardiac (including arrhythmic or valvular) or lung issues? Aortic valve stenosis  Personal or family of bleeding issues? No  Hx of HTN? Yes        Controlled? yes     Smoker? No  Etoh or other substance use?  2 glasses of wine weekly     On anticoagulation, insulin, or steroids? No  Any concern with current medications? No  Pacemaker present? No        and if so, has it been interrogated in the last 12 months? N/A  Known GABRIEL? No   Known liver disease? No    Blood pressure (!) 163/77, pulse 73, temperature 97.1 °F (36.2 °C), resp. rate 16, height 5' 10\" (1.778 m), weight 81 kg (178 lb 9.2 oz), SpO2 99 %. Review of Systems  Review of Systems   Constitutional:  Negative for chills, fatigue and fever. HENT:  Positive for hearing loss (bilateral hearing aids). Negative for congestion, postnasal drip, sore throat and trouble swallowing. Eyes:  Negative for discharge. Respiratory:  Negative for cough, shortness of breath and wheezing. Cardiovascular:  Negative for chest pain, palpitations and leg swelling. Gastrointestinal:  Negative for abdominal pain, constipation, diarrhea, nausea and vomiting. Genitourinary:  Negative for dysuria, flank pain, frequency and urgency. Musculoskeletal:  Positive for arthralgias (right shoulder). Skin:  Negative for rash and wound. Neurological:  Negative for dizziness, light-headedness and headaches. Psychiatric/Behavioral:  Negative for dysphoric mood. The patient is not nervous/anxious. Physical Exam  Vitals and nursing note reviewed. Constitutional:       General: He is not in acute distress. Appearance: Normal appearance. HENT:      Head: Normocephalic and atraumatic.       Right Ear: Tympanic membrane, ear canal and external ear normal.      Left Ear: Tympanic membrane, ear canal and external ear normal.      Nose: Nose normal.      Mouth/Throat:      Mouth: Mucous membranes are moist.      Pharynx: Oropharynx is clear. Eyes:      Extraocular Movements: Extraocular movements intact. Cardiovascular:      Rate and Rhythm: Normal rate and regular rhythm. Heart sounds: Murmur heard. Pulmonary:      Effort: Pulmonary effort is normal.      Breath sounds: Normal breath sounds. No wheezing or rhonchi. Abdominal:      General: Bowel sounds are normal.      Palpations: Abdomen is soft. Tenderness: There is no abdominal tenderness. There is no right CVA tenderness or left CVA tenderness. Musculoskeletal:      Right shoulder: Tenderness present. Decreased range of motion. Cervical back: Normal range of motion and neck supple. Skin:     General: Skin is warm and dry. Findings: No rash. Neurological:      General: No focal deficit present. Mental Status: He is alert and oriented to person, place, and time. Psychiatric:         Mood and Affect: Mood normal.         Behavior: Behavior normal.        Assessment  Osteoarthritis of Glenohumeral joint, Right  Preoperative evaluation     Plan  Labs and EKG pending  Cardiac clearance pending  Plan for Right reverse shoulder arthroplasty with biceps tenodesis and axillary dissection    According to the 2014 ACC/AHA pre-operative risk assessment guidelines Mamadou Mascorro is at low risk for major cardiac complications during a Intermediate Risk procedure, exercise tolerance is >4 METS  and procedure may proceed as planned.      Request further recommendations from consultants: Cardiology

## 2023-03-20 NOTE — PERIOP NOTES
Four days post partum.  Home advice.  Brooklynn Poole RN  [facility]      Additional Information    Negative: [1] SEVERE breast pain AND [2] fever > 103 F (39.4 C)    Negative: [1] Breast looks infected (spreading redness, feels hot to touch) AND [2] large red area (> 2 in. or 5 cm)    Negative: Fever > 100.4 F (38.0 C)    Negative: [1] SEVERE pain AND [2] not improved after 2 hours of pain medicine and Care Advice    Negative: [1] Breast looks infected (area of redness) AND [2] no fever    Negative: [1] Breast pain AND [2] present > 7 days    Normal postpartum breast pain and engorgement  (all triage questions negative)    Protocols used: POSTPARTUM - BREAST PAIN AND ENGORGEMENT-ADULT-       [address] [address] [facility]   MAIN OR                                  [phone]   MAIN PRE OP                          [phone]                                                                                AMBULATORY PRE OP          [phone]  PRE-ADMISSION TESTING    [phone]     Surgery Date:  4/3 Monday        Is surgery arrival time given by surgeon? NO  If NO, [facility] staff will call you between 3 and 7pm the day before your surgery with your arrival time. (If your surgery is on a Monday, we will call you the Friday before.)    Call [phone] after 7pm Monday-Friday if you did not receive this call. INSTRUCTIONS BEFORE YOUR SURGERY   When You  Arrive Arrive at the 2nd [address] [facility] on the day of your surgery  Have your insurance card, photo ID, and any copayment (if needed)   Food   and   Drink NO food or drink after midnight the night before surgery    This means NO water, gum, mints, coffee, juice, etc.  No alcohol (beer, wine, liquor) 24 hours before and after surgery   Medications to   TAKE   Morning of Surgery MEDICATIONS TO TAKE THE MORNING OF SURGERY WITH A SIP OF WATER:   None    Please do not take your valsartan the night before surgery. Medications  To  STOP      7 days before surgery Non-Steroidal anti-inflammatory Drugs (NSAID's): for example, Ibuprofen (Advil, Motrin), Naproxen (Aleve)  Aspirin, if taking for pain   Herbal supplements, vitamins, and fish oil  Other: Areds, Vitamin D  (Pain medications not listed above, including Tylenol may be taken)   Blood  Thinners If you take  Aspirin, Plavix, Coumadin, or any blood-thinning or anti-blood clot medicine, talk to the doctor who prescribed the medications for pre-operative instructions.    Bathing Clothing  Jewelry  Valuables     If you shower the morning of surgery, please do not apply anything to your skin (lotions, powders, deodorant, or makeup, especially mascara)  Follow Chlorhexidine [employer] body wash instructions provided to you during PAT appointment. Begin 3 days prior to surgery. Do not shave or trim anywhere 24 hours before surgery  Wear your hair loose or down; no pony-tails, buns, or metal hair clips  Wear loose, comfortable, clean clothes  Wear glasses instead of contacts  Leave money, valuables, and jewelry, including body piercings, at home  If you were given an [employer], bring it on day of surgery. Going Home - or Spending the Night SAME-DAY SURGERY: You must have a responsible adult drive you home and stay with you 24 hours after surgery  ADMITS: If your doctor is keeping you in the hospital after surgery, leave personal belongings/luggage in your car until you have a hospital room number. Hospital discharge time is 12 noon  Drivers must be here before 12 noon unless you are told differently   Special Instructions Please bring DNR with you on day of surgery. Follow all instructions so your surgery wont be cancelled. Please, be on time. If a situation occurs and you are delayed the day of surgery, call [phone]     If your physical condition changes (like a fever, cold, flu, etc.) call your surgeon. Home medication(s) reviewed and verified verbally with list during PAT appointment. The patient was contacted in person. The patient verbalizes understanding of all instructions and does not need reinforcement.

## 2023-03-21 LAB
BACTERIA SPEC CULT: NORMAL
BACTERIA SPEC CULT: NORMAL
SERVICE CMNT-IMP: NORMAL

## 2023-10-02 NOTE — H&P
Kacey Adams was referred for evaluation by:Dr. Christine Olmstead for Pre- Op Evaluation. Please see encounter details and orders for consultative summary. Type of surgery : Right reverse shoulder arthroplasty with biceps tenodesis and axillary dissection  Surgery site : Right shoulder  Anesthesia type: General  Date of procedure:  10/23/2023     This 82y.o. year old male presents with complaints of right shoulder pain and decrease in range of motion for years. Conservative measures including medication management and physical therapy have been exhausted prior to the decision for surgery. Patient has discussed the risks, alternatives, and benefits of the surgery with surgeon and has elected to proceed with surgical intervention. He was initially scheduled for this surgery in 4/2023 but failed cardiac clearance. He had cardiac catheterization on 3/30/2023 which he reports was \"completely normal\". Had Transcatheter Aortic Valve Replacement at [facility] on 5/16/2023 with Dr Nichole Mart. Has been doing well since then. On Plavix and Aspirin daily. He is flying to [facility] tomorrow and will be returning on 10/16/2023.      Allergies: No Known Allergies  Latex allergy: no  Prior reactions to anesthesia:  \"convulsions\" after leg surgery in [place]; no issues with subsequent surgeries     Current Outpatient Medications   Medication Sig    clopidogrel (PLAVIX) 75 MG tablet Take 1 tablet by mouth at bedtime    aspirin 81 MG EC tablet Take 1 tablet by mouth at bedtime    Cholecalciferol (VITAMIN D-3 PO) Take 2,000 Units by mouth in the morning and at bedtime    Multiple Vitamins-Minerals (PRESERVISION AREDS 2 PO) Take 2 tablets by mouth daily    atorvastatin (LIPITOR) 10 MG tablet Take 10 mg by mouth nightly    vitamin D (CHOLECALCIFEROL) 25 MCG (1000 UT) TABS tablet Take 1 tablet by mouth 2 times daily    valsartan (DIOVAN) 320 MG tablet Take 320 mg by mouth every evening     No current

## 2023-10-03 ENCOUNTER — HOSPITAL ENCOUNTER (OUTPATIENT)
Facility: HOSPITAL | Age: 83
Discharge: HOME | End: 2023-10-06
Payer: MEDICARE

## 2023-10-03 VITALS
RESPIRATION RATE: 20 BRPM | OXYGEN SATURATION: 100 % | SYSTOLIC BLOOD PRESSURE: 176 MMHG | WEIGHT: 177 LBS | DIASTOLIC BLOOD PRESSURE: 84 MMHG | BODY MASS INDEX: 25.34 KG/M2 | TEMPERATURE: 97.3 F | HEART RATE: 67 BPM | HEIGHT: 70 IN

## 2023-10-03 PROBLEM — M19.011 PRIMARY OSTEOARTHRITIS OF RIGHT SHOULDER: Status: ACTIVE | Noted: 2023-10-03

## 2023-10-03 LAB
ABO + RH BLD: NORMAL
ALBUMIN SERPL-MCNC: 3.8 G/DL (ref 3.5–5)
ALBUMIN/GLOB SERPL: 1.4 (ref 1.1–2.2)
ALP SERPL-CCNC: 82 U/L (ref 45–117)
ALT SERPL-CCNC: 34 U/L (ref 12–78)
ANION GAP SERPL CALC-SCNC: 4 MMOL/L (ref 5–15)
APPEARANCE UR: CLEAR
APTT PPP: 28.9 SEC (ref 22.1–31)
AST SERPL-CCNC: 21 U/L (ref 15–37)
BACTERIA URNS QL MICRO: NEGATIVE /HPF
BASOPHILS # BLD: 0.1 K/UL (ref 0–0.1)
BASOPHILS NFR BLD: 1 % (ref 0–1)
BILIRUB SERPL-MCNC: 0.5 MG/DL (ref 0.2–1)
BILIRUB UR QL: NEGATIVE
BLOOD GROUP ANTIBODIES SERPL: NORMAL
BUN SERPL-MCNC: 23 MG/DL (ref 6–20)
BUN/CREAT SERPL: 24 (ref 12–20)
CALCIUM SERPL-MCNC: 8.4 MG/DL (ref 8.5–10.1)
CHLORIDE SERPL-SCNC: 108 MMOL/L (ref 97–108)
CO2 SERPL-SCNC: 31 MMOL/L (ref 21–32)
COLOR UR: NORMAL
CREAT SERPL-MCNC: 0.97 MG/DL (ref 0.7–1.3)
DIFFERENTIAL METHOD BLD: ABNORMAL
EOSINOPHIL # BLD: 0.2 K/UL (ref 0–0.4)
EOSINOPHIL NFR BLD: 3 % (ref 0–7)
EPITH CASTS URNS QL MICRO: NORMAL /LPF
ERYTHROCYTE [DISTWIDTH] IN BLOOD BY AUTOMATED COUNT: 13.2 % (ref 11.5–14.5)
EST. AVERAGE GLUCOSE BLD GHB EST-MCNC: 108 MG/DL
GLOBULIN SER CALC-MCNC: 2.8 G/DL (ref 2–4)
GLUCOSE SERPL-MCNC: 112 MG/DL (ref 65–100)
GLUCOSE UR STRIP.AUTO-MCNC: NEGATIVE MG/DL
HBA1C MFR BLD: 5.4 % (ref 4–5.6)
HCT VFR BLD AUTO: 36.7 % (ref 36.6–50.3)
HGB BLD-MCNC: 12 G/DL (ref 12.1–17)
HGB UR QL STRIP: NEGATIVE
HYALINE CASTS URNS QL MICRO: NORMAL /LPF (ref 0–2)
IMM GRANULOCYTES # BLD AUTO: 0 K/UL (ref 0–0.04)
IMM GRANULOCYTES NFR BLD AUTO: 0 % (ref 0–0.5)
INR PPP: 1.1 (ref 0.9–1.1)
KETONES UR QL STRIP.AUTO: NEGATIVE MG/DL
LEUKOCYTE ESTERASE UR QL STRIP.AUTO: NEGATIVE
LYMPHOCYTES # BLD: 1.4 K/UL (ref 0.8–3.5)
LYMPHOCYTES NFR BLD: 23 % (ref 12–49)
MCH RBC QN AUTO: 29.5 PG (ref 26–34)
MCHC RBC AUTO-ENTMCNC: 32.7 G/DL (ref 30–36.5)
MCV RBC AUTO: 90.2 FL (ref 80–99)
MONOCYTES # BLD: 0.5 K/UL (ref 0–1)
MONOCYTES NFR BLD: 8 % (ref 5–13)
NEUTS SEG # BLD: 3.8 K/UL (ref 1.8–8)
NEUTS SEG NFR BLD: 65 % (ref 32–75)
NITRITE UR QL STRIP.AUTO: NEGATIVE
NRBC # BLD: 0 K/UL (ref 0–0.01)
NRBC BLD-RTO: 0 PER 100 WBC
PH UR STRIP: 6.5 (ref 5–8)
PLATELET # BLD AUTO: 177 K/UL (ref 150–400)
PMV BLD AUTO: 9.5 FL (ref 8.9–12.9)
POTASSIUM SERPL-SCNC: 4.2 MMOL/L (ref 3.5–5.1)
PROT SERPL-MCNC: 6.6 G/DL (ref 6.4–8.2)
PROT UR STRIP-MCNC: NEGATIVE MG/DL
PROTHROMBIN TIME: 11.1 SEC (ref 9–11.1)
RBC # BLD AUTO: 4.07 M/UL (ref 4.1–5.7)
RBC #/AREA URNS HPF: NORMAL /HPF (ref 0–5)
SODIUM SERPL-SCNC: 143 MMOL/L (ref 136–145)
SP GR UR REFRACTOMETRY: 1.02 (ref 1–1.03)
SPECIMEN EXP DATE BLD: NORMAL
THERAPEUTIC RANGE: NORMAL SECS (ref 58–77)
URINE CULTURE IF INDICATED: NORMAL
UROBILINOGEN UR QL STRIP.AUTO: 1 EU/DL (ref 0.2–1)
WBC # BLD AUTO: 5.9 K/UL (ref 4.1–11.1)
WBC URNS QL MICRO: NORMAL /HPF (ref 0–4)

## 2023-10-03 PROCEDURE — 86900 BLOOD TYPING SEROLOGIC ABO: CPT

## 2023-10-03 PROCEDURE — 83036 HEMOGLOBIN GLYCOSYLATED A1C: CPT

## 2023-10-03 PROCEDURE — 85610 PROTHROMBIN TIME: CPT

## 2023-10-03 PROCEDURE — 86901 BLOOD TYPING SEROLOGIC RH(D): CPT

## 2023-10-03 PROCEDURE — 93005 ELECTROCARDIOGRAM TRACING: CPT | Performed by: ORTHOPAEDIC SURGERY

## 2023-10-03 PROCEDURE — 36415 COLL VENOUS BLD VENIPUNCTURE: CPT

## 2023-10-03 PROCEDURE — 85730 THROMBOPLASTIN TIME PARTIAL: CPT

## 2023-10-03 PROCEDURE — 86850 RBC ANTIBODY SCREEN: CPT

## 2023-10-03 PROCEDURE — 85025 COMPLETE CBC W/AUTO DIFF WBC: CPT

## 2023-10-03 PROCEDURE — APPNB30 APP NON BILLABLE TIME 0-30 MINS: Performed by: NURSE PRACTITIONER

## 2023-10-03 PROCEDURE — 81001 URINALYSIS AUTO W/SCOPE: CPT

## 2023-10-03 PROCEDURE — 80053 COMPREHEN METABOLIC PANEL: CPT

## 2023-10-03 RX ORDER — CLOPIDOGREL BISULFATE 75 MG/1
75 TABLET ORAL NIGHTLY
COMMUNITY

## 2023-10-03 RX ORDER — ASPIRIN 81 MG/1
81 TABLET ORAL NIGHTLY
COMMUNITY

## 2023-10-03 ASSESSMENT — PAIN DESCRIPTION - DESCRIPTORS: DESCRIPTORS: ACHING

## 2023-10-03 ASSESSMENT — ENCOUNTER SYMPTOMS
ABDOMINAL PAIN: 0
SORE THROAT: 0
WHEEZING: 0
TROUBLE SWALLOWING: 0
VOMITING: 0
BLOOD IN STOOL: 0
COUGH: 0
NAUSEA: 0
SHORTNESS OF BREATH: 0

## 2023-10-03 ASSESSMENT — PAIN DESCRIPTION - FREQUENCY: FREQUENCY: INTERMITTENT

## 2023-10-03 ASSESSMENT — PAIN DESCRIPTION - PAIN TYPE: TYPE: CHRONIC PAIN

## 2023-10-03 ASSESSMENT — PAIN DESCRIPTION - ORIENTATION: ORIENTATION: RIGHT

## 2023-10-03 ASSESSMENT — PAIN DESCRIPTION - LOCATION: LOCATION: SHOULDER

## 2023-10-04 LAB
BACTERIA SPEC CULT: NORMAL
BACTERIA SPEC CULT: NORMAL
EKG ATRIAL RATE: 66 BPM
EKG DIAGNOSIS: NORMAL
EKG P AXIS: 38 DEGREES
EKG P-R INTERVAL: 182 MS
EKG Q-T INTERVAL: 378 MS
EKG QRS DURATION: 90 MS
EKG QTC CALCULATION (BAZETT): 396 MS
EKG R AXIS: 18 DEGREES
EKG T AXIS: 24 DEGREES
EKG VENTRICULAR RATE: 66 BPM
SERVICE CMNT-IMP: NORMAL

## 2023-10-04 PROCEDURE — 93010 ELECTROCARDIOGRAM REPORT: CPT | Performed by: STUDENT IN AN ORGANIZED HEALTH CARE EDUCATION/TRAINING PROGRAM

## 2023-10-20 RX ORDER — DIPHENHYDRAMINE HYDROCHLORIDE 50 MG/ML
12.5 INJECTION INTRAMUSCULAR; INTRAVENOUS
Status: CANCELLED | OUTPATIENT
Start: 2023-10-20 | End: 2023-10-21

## 2023-10-20 RX ORDER — ONDANSETRON 2 MG/ML
4 INJECTION INTRAMUSCULAR; INTRAVENOUS
Status: CANCELLED | OUTPATIENT
Start: 2023-10-20 | End: 2023-10-21

## 2023-10-20 RX ORDER — SODIUM CHLORIDE, SODIUM LACTATE, POTASSIUM CHLORIDE, CALCIUM CHLORIDE 600; 310; 30; 20 MG/100ML; MG/100ML; MG/100ML; MG/100ML
INJECTION, SOLUTION INTRAVENOUS CONTINUOUS
Status: CANCELLED | OUTPATIENT
Start: 2023-10-20

## 2023-10-20 NOTE — PERIOP NOTE
Patient called to state that he has developed a \"head cold\" and does not feel \"good enough to have surgery\". Patient request that PAT call Dr. Mira Kenny office to inform them. Message left for Dr. Mira Kenny team to inform them that patient wishes to reschedule surgery. 1015: Spoke with Aleta Patel at Dr. Tati Avila' office regarding patient wanting to reschedule surgery. She stated that she would reach out to the patient and talk with him.

## 2023-11-01 ENCOUNTER — HOSPITAL ENCOUNTER (OUTPATIENT)
Facility: HOSPITAL | Age: 83
Discharge: HOME | End: 2023-11-04
Payer: MEDICARE

## 2023-11-01 VITALS
HEIGHT: 70 IN | SYSTOLIC BLOOD PRESSURE: 175 MMHG | DIASTOLIC BLOOD PRESSURE: 88 MMHG | HEART RATE: 72 BPM | TEMPERATURE: 97.9 F | OXYGEN SATURATION: 98 % | BODY MASS INDEX: 25.3 KG/M2 | RESPIRATION RATE: 18 BRPM | WEIGHT: 176.7 LBS

## 2023-11-01 LAB
ABO + RH BLD: NORMAL
ALBUMIN SERPL-MCNC: 4.3 G/DL (ref 3.5–5)
ALBUMIN/GLOB SERPL: 1.4 (ref 1.1–2.2)
ALP SERPL-CCNC: 86 U/L (ref 45–117)
ALT SERPL-CCNC: 35 U/L (ref 12–78)
ANION GAP SERPL CALC-SCNC: 4 MMOL/L (ref 5–15)
APPEARANCE UR: CLEAR
APTT PPP: 29.6 SEC (ref 22.1–31)
AST SERPL-CCNC: 24 U/L (ref 15–37)
BACTERIA URNS QL MICRO: NEGATIVE /HPF
BASOPHILS # BLD: 0.1 K/UL (ref 0–0.1)
BASOPHILS NFR BLD: 1 % (ref 0–1)
BILIRUB SERPL-MCNC: 0.8 MG/DL (ref 0.2–1)
BILIRUB UR QL: NEGATIVE
BLOOD GROUP ANTIBODIES SERPL: NORMAL
BUN SERPL-MCNC: 23 MG/DL (ref 6–20)
BUN/CREAT SERPL: 26 (ref 12–20)
CALCIUM SERPL-MCNC: 8.8 MG/DL (ref 8.5–10.1)
CHLORIDE SERPL-SCNC: 114 MMOL/L (ref 97–108)
CO2 SERPL-SCNC: 31 MMOL/L (ref 21–32)
COLOR UR: ABNORMAL
CREAT SERPL-MCNC: 0.9 MG/DL (ref 0.7–1.3)
DIFFERENTIAL METHOD BLD: NORMAL
EOSINOPHIL # BLD: 0.1 K/UL (ref 0–0.4)
EOSINOPHIL NFR BLD: 2 % (ref 0–7)
EPITH CASTS URNS QL MICRO: ABNORMAL /LPF
ERYTHROCYTE [DISTWIDTH] IN BLOOD BY AUTOMATED COUNT: 13.1 % (ref 11.5–14.5)
EST. AVERAGE GLUCOSE BLD GHB EST-MCNC: 100 MG/DL
GLOBULIN SER CALC-MCNC: 3.1 G/DL (ref 2–4)
GLUCOSE SERPL-MCNC: 90 MG/DL (ref 65–100)
GLUCOSE UR STRIP.AUTO-MCNC: NEGATIVE MG/DL
HBA1C MFR BLD: 5.1 % (ref 4–5.6)
HCT VFR BLD AUTO: 38.5 % (ref 36.6–50.3)
HGB BLD-MCNC: 12.5 G/DL (ref 12.1–17)
HGB UR QL STRIP: NEGATIVE
HYALINE CASTS URNS QL MICRO: ABNORMAL /LPF (ref 0–2)
IMM GRANULOCYTES # BLD AUTO: 0 K/UL (ref 0–0.04)
IMM GRANULOCYTES NFR BLD AUTO: 0 % (ref 0–0.5)
INR PPP: 1 (ref 0.9–1.1)
KETONES UR QL STRIP.AUTO: ABNORMAL MG/DL
LEUKOCYTE ESTERASE UR QL STRIP.AUTO: NEGATIVE
LYMPHOCYTES # BLD: 1.2 K/UL (ref 0.8–3.5)
LYMPHOCYTES NFR BLD: 19 % (ref 12–49)
MCH RBC QN AUTO: 29 PG (ref 26–34)
MCHC RBC AUTO-ENTMCNC: 32.5 G/DL (ref 30–36.5)
MCV RBC AUTO: 89.3 FL (ref 80–99)
MONOCYTES # BLD: 0.6 K/UL (ref 0–1)
MONOCYTES NFR BLD: 9 % (ref 5–13)
NEUTS SEG # BLD: 4.4 K/UL (ref 1.8–8)
NEUTS SEG NFR BLD: 69 % (ref 32–75)
NITRITE UR QL STRIP.AUTO: NEGATIVE
NRBC # BLD: 0 K/UL (ref 0–0.01)
NRBC BLD-RTO: 0 PER 100 WBC
PH UR STRIP: 5 (ref 5–8)
PLATELET # BLD AUTO: 180 K/UL (ref 150–400)
PMV BLD AUTO: 9.1 FL (ref 8.9–12.9)
POTASSIUM SERPL-SCNC: 4.2 MMOL/L (ref 3.5–5.1)
PROT SERPL-MCNC: 7.4 G/DL (ref 6.4–8.2)
PROT UR STRIP-MCNC: NEGATIVE MG/DL
PROTHROMBIN TIME: 10.9 SEC (ref 9–11.1)
RBC # BLD AUTO: 4.31 M/UL (ref 4.1–5.7)
RBC #/AREA URNS HPF: ABNORMAL /HPF (ref 0–5)
SODIUM SERPL-SCNC: 149 MMOL/L (ref 136–145)
SP GR UR REFRACTOMETRY: 1.01 (ref 1–1.03)
SPECIMEN EXP DATE BLD: NORMAL
THERAPEUTIC RANGE: NORMAL SECS (ref 58–77)
URINE CULTURE IF INDICATED: ABNORMAL
UROBILINOGEN UR QL STRIP.AUTO: 0.2 EU/DL (ref 0.2–1)
WBC # BLD AUTO: 6.4 K/UL (ref 4.1–11.1)
WBC URNS QL MICRO: ABNORMAL /HPF (ref 0–4)

## 2023-11-01 PROCEDURE — 85610 PROTHROMBIN TIME: CPT

## 2023-11-01 PROCEDURE — 86850 RBC ANTIBODY SCREEN: CPT

## 2023-11-01 PROCEDURE — 85025 COMPLETE CBC W/AUTO DIFF WBC: CPT

## 2023-11-01 PROCEDURE — 85730 THROMBOPLASTIN TIME PARTIAL: CPT

## 2023-11-01 PROCEDURE — 36415 COLL VENOUS BLD VENIPUNCTURE: CPT

## 2023-11-01 PROCEDURE — 86900 BLOOD TYPING SEROLOGIC ABO: CPT

## 2023-11-01 PROCEDURE — 83036 HEMOGLOBIN GLYCOSYLATED A1C: CPT

## 2023-11-01 PROCEDURE — 81001 URINALYSIS AUTO W/SCOPE: CPT

## 2023-11-01 PROCEDURE — 80053 COMPREHEN METABOLIC PANEL: CPT

## 2023-11-01 PROCEDURE — 86901 BLOOD TYPING SEROLOGIC RH(D): CPT

## 2023-11-01 NOTE — PERIOP NOTE
BP elevated at PAT; pt without complaints. He will discuss blood pressure with Dr Wanda Kilgore at pre- op appointment scheduled tomorrow 11/2/23.     11/1/23 @ [phone]:  Pt called with blood pressure reading form home 134/71.

## 2023-11-02 LAB
BACTERIA SPEC CULT: NORMAL
BACTERIA SPEC CULT: NORMAL
SERVICE CMNT-IMP: NORMAL

## 2023-11-10 RX ORDER — OXYCODONE HYDROCHLORIDE 5 MG/1
5 TABLET ORAL
Status: CANCELLED | OUTPATIENT
Start: 2023-11-10 | End: 2023-11-11

## 2023-11-10 RX ORDER — FENTANYL CITRATE 50 UG/ML
25 INJECTION, SOLUTION INTRAMUSCULAR; INTRAVENOUS EVERY 5 MIN PRN
Status: CANCELLED | OUTPATIENT
Start: 2023-11-10

## 2023-11-10 RX ORDER — KETOROLAC TROMETHAMINE 15 MG/ML
15 INJECTION, SOLUTION INTRAMUSCULAR; INTRAVENOUS
Status: CANCELLED | OUTPATIENT
Start: 2023-11-10 | End: 2023-11-11

## 2023-11-13 ENCOUNTER — ANESTHESIA EVENT (OUTPATIENT)
Facility: HOSPITAL | Age: 83
End: 2023-11-13
Payer: MEDICARE

## 2023-11-13 ENCOUNTER — APPOINTMENT (OUTPATIENT)
Facility: HOSPITAL | Age: 83
End: 2023-11-13
Attending: ORTHOPAEDIC SURGERY
Payer: MEDICARE

## 2023-11-13 ENCOUNTER — ANESTHESIA (OUTPATIENT)
Facility: HOSPITAL | Age: 83
End: 2023-11-13
Payer: MEDICARE

## 2023-11-13 ENCOUNTER — HOSPITAL ENCOUNTER (OUTPATIENT)
Facility: HOSPITAL | Age: 83
Setting detail: OUTPATIENT SURGERY
Discharge: HOME | End: 2023-11-13
Attending: ORTHOPAEDIC SURGERY | Admitting: ORTHOPAEDIC SURGERY
Payer: MEDICARE

## 2023-11-13 VITALS
HEART RATE: 70 BPM | TEMPERATURE: 97.7 F | OXYGEN SATURATION: 95 % | SYSTOLIC BLOOD PRESSURE: 111 MMHG | RESPIRATION RATE: 13 BRPM | DIASTOLIC BLOOD PRESSURE: 61 MMHG

## 2023-11-13 DIAGNOSIS — M19.011 PRIMARY OSTEOARTHRITIS OF RIGHT SHOULDER: Primary | ICD-10-CM

## 2023-11-13 PROCEDURE — 3700000001 HC ADD 15 MINUTES (ANESTHESIA): Performed by: ORTHOPAEDIC SURGERY

## 2023-11-13 PROCEDURE — C9290 INJ, BUPIVACAINE LIPOSOME: HCPCS | Performed by: ANESTHESIOLOGY

## 2023-11-13 PROCEDURE — 2580000003 HC RX 258: Performed by: ANESTHESIOLOGY

## 2023-11-13 PROCEDURE — 2580000003 HC RX 258: Performed by: ORTHOPAEDIC SURGERY

## 2023-11-13 PROCEDURE — 3600000005 HC SURGERY LEVEL 5 BASE: Performed by: ORTHOPAEDIC SURGERY

## 2023-11-13 PROCEDURE — 64415 NJX AA&/STRD BRCH PLXS IMG: CPT | Performed by: ANESTHESIOLOGY

## 2023-11-13 PROCEDURE — 7100000000 HC PACU RECOVERY - FIRST 15 MIN: Performed by: ORTHOPAEDIC SURGERY

## 2023-11-13 PROCEDURE — 3700000000 HC ANESTHESIA ATTENDED CARE: Performed by: ORTHOPAEDIC SURGERY

## 2023-11-13 PROCEDURE — 2500000003 HC RX 250 WO HCPCS: Performed by: NURSE ANESTHETIST, CERTIFIED REGISTERED

## 2023-11-13 PROCEDURE — 7100000001 HC PACU RECOVERY - ADDTL 15 MIN: Performed by: ORTHOPAEDIC SURGERY

## 2023-11-13 PROCEDURE — 7100000011 HC PHASE II RECOVERY - ADDTL 15 MIN: Performed by: ORTHOPAEDIC SURGERY

## 2023-11-13 PROCEDURE — 6360000002 HC RX W HCPCS: Performed by: NURSE ANESTHETIST, CERTIFIED REGISTERED

## 2023-11-13 PROCEDURE — 6360000002 HC RX W HCPCS: Performed by: ORTHOPAEDIC SURGERY

## 2023-11-13 PROCEDURE — 6360000002 HC RX W HCPCS: Performed by: ANESTHESIOLOGY

## 2023-11-13 PROCEDURE — 73020 X-RAY EXAM OF SHOULDER: CPT

## 2023-11-13 PROCEDURE — 3600000015 HC SURGERY LEVEL 5 ADDTL 15MIN: Performed by: ORTHOPAEDIC SURGERY

## 2023-11-13 PROCEDURE — 2709999900 HC NON-CHARGEABLE SUPPLY: Performed by: ORTHOPAEDIC SURGERY

## 2023-11-13 PROCEDURE — 6370000000 HC RX 637 (ALT 250 FOR IP): Performed by: ANESTHESIOLOGY

## 2023-11-13 PROCEDURE — C1776 JOINT DEVICE (IMPLANTABLE): HCPCS | Performed by: ORTHOPAEDIC SURGERY

## 2023-11-13 PROCEDURE — 7100000010 HC PHASE II RECOVERY - FIRST 15 MIN: Performed by: ORTHOPAEDIC SURGERY

## 2023-11-13 DEVICE — IMPLANTABLE DEVICE: Type: IMPLANTABLE DEVICE | Site: SHOULDER | Status: FUNCTIONAL

## 2023-11-13 DEVICE — SCREW BNE LCK 4.5X15 MM RVS BASEPLT PERPENDICULAR NS INSET: Type: IMPLANTABLE DEVICE | Site: SHOULDER | Status: FUNCTIONAL

## 2023-11-13 DEVICE — SCREW BNE LCK 4.5X25 MM RVS BASEPLT PERPENDICULAR NS INSET: Type: IMPLANTABLE DEVICE | Site: SHOULDER | Status: FUNCTIONAL

## 2023-11-13 DEVICE — SHOULDER INNOVATIONS INSET REVERSE TOTAL SHOULDER - HUMERAL TRAY. 38MM X 0MM.  TITANIUM
Type: IMPLANTABLE DEVICE | Site: SHOULDER | Status: FUNCTIONAL
Brand: SHOULDER INNOVATIONS INSET REVERSE TOTAL SHOULDER

## 2023-11-13 DEVICE — IMPL CAPPED SHOULDER S3 TOTAL ADV REVERSE: Type: IMPLANTABLE DEVICE | Site: SHOULDER | Status: FUNCTIONAL

## 2023-11-13 RX ORDER — HYDROCODONE BITARTRATE AND ACETAMINOPHEN 5; 325 MG/1; MG/1
1 TABLET ORAL EVERY 4 HOURS PRN
Qty: 42 TABLET | Refills: 0
Start: 2023-11-13 | End: 2023-11-20

## 2023-11-13 RX ORDER — CELECOXIB 100 MG/1
100 CAPSULE ORAL ONCE
Status: COMPLETED | OUTPATIENT
Start: 2023-11-13 | End: 2023-11-13

## 2023-11-13 RX ORDER — SUCCINYLCHOLINE/SOD CL,ISO/PF 100 MG/5ML
SYRINGE (ML) INTRAVENOUS PRN
Status: DISCONTINUED | OUTPATIENT
Start: 2023-11-13 | End: 2023-11-13 | Stop reason: SDUPTHER

## 2023-11-13 RX ORDER — ONDANSETRON 2 MG/ML
INJECTION INTRAMUSCULAR; INTRAVENOUS PRN
Status: DISCONTINUED | OUTPATIENT
Start: 2023-11-13 | End: 2023-11-13 | Stop reason: SDUPTHER

## 2023-11-13 RX ORDER — ROCURONIUM BROMIDE 10 MG/ML
INJECTION, SOLUTION INTRAVENOUS PRN
Status: DISCONTINUED | OUTPATIENT
Start: 2023-11-13 | End: 2023-11-13 | Stop reason: SDUPTHER

## 2023-11-13 RX ORDER — ACETAMINOPHEN 325 MG/1
975 TABLET ORAL ONCE
Status: COMPLETED | OUTPATIENT
Start: 2023-11-13 | End: 2023-11-13

## 2023-11-13 RX ORDER — LIDOCAINE HYDROCHLORIDE 10 MG/ML
1 INJECTION, SOLUTION EPIDURAL; INFILTRATION; INTRACAUDAL; PERINEURAL
Status: DISCONTINUED | OUTPATIENT
Start: 2023-11-13 | End: 2023-11-13 | Stop reason: HOSPADM

## 2023-11-13 RX ORDER — ASPIRIN 81 MG/1
81 TABLET ORAL
Qty: 28 TABLET | Refills: 0
Start: 2023-11-13 | End: 2023-11-27

## 2023-11-13 RX ORDER — SODIUM CHLORIDE, SODIUM LACTATE, POTASSIUM CHLORIDE, CALCIUM CHLORIDE 600; 310; 30; 20 MG/100ML; MG/100ML; MG/100ML; MG/100ML
INJECTION, SOLUTION INTRAVENOUS CONTINUOUS
Status: DISCONTINUED | OUTPATIENT
Start: 2023-11-13 | End: 2023-11-13 | Stop reason: HOSPADM

## 2023-11-13 RX ORDER — SULFAMETHOXAZOLE AND TRIMETHOPRIM 800; 160 MG/1; MG/1
1 TABLET ORAL 2 TIMES DAILY
Qty: 6 TABLET | Refills: 0
Start: 2023-11-13 | End: 2023-11-16

## 2023-11-13 RX ORDER — FENTANYL CITRATE 50 UG/ML
INJECTION, SOLUTION INTRAMUSCULAR; INTRAVENOUS PRN
Status: DISCONTINUED | OUTPATIENT
Start: 2023-11-13 | End: 2023-11-13 | Stop reason: SDUPTHER

## 2023-11-13 RX ORDER — TAMSULOSIN HYDROCHLORIDE 0.4 MG/1
0.4 CAPSULE ORAL DAILY
Qty: 3 CAPSULE | Refills: 0
Start: 2023-11-13 | End: 2023-11-16

## 2023-11-13 RX ORDER — MIDAZOLAM HYDROCHLORIDE 2 MG/2ML
2 INJECTION, SOLUTION INTRAMUSCULAR; INTRAVENOUS
Status: DISCONTINUED | OUTPATIENT
Start: 2023-11-13 | End: 2023-11-13 | Stop reason: HOSPADM

## 2023-11-13 RX ORDER — PHENYLEPHRINE HCL IN 0.9% NACL 0.4MG/10ML
SYRINGE (ML) INTRAVENOUS PRN
Status: DISCONTINUED | OUTPATIENT
Start: 2023-11-13 | End: 2023-11-13 | Stop reason: SDUPTHER

## 2023-11-13 RX ORDER — FAMOTIDINE 10 MG/ML
INJECTION, SOLUTION INTRAVENOUS PRN
Status: DISCONTINUED | OUTPATIENT
Start: 2023-11-13 | End: 2023-11-13 | Stop reason: SDUPTHER

## 2023-11-13 RX ORDER — DEXAMETHASONE SODIUM PHOSPHATE 4 MG/ML
INJECTION, SOLUTION INTRA-ARTICULAR; INTRALESIONAL; INTRAMUSCULAR; INTRAVENOUS; SOFT TISSUE PRN
Status: DISCONTINUED | OUTPATIENT
Start: 2023-11-13 | End: 2023-11-13 | Stop reason: SDUPTHER

## 2023-11-13 RX ORDER — VANCOMYCIN HYDROCHLORIDE 1 G/20ML
INJECTION, POWDER, LYOPHILIZED, FOR SOLUTION INTRAVENOUS PRN
Status: DISCONTINUED | OUTPATIENT
Start: 2023-11-13 | End: 2023-11-13 | Stop reason: ALTCHOICE

## 2023-11-13 RX ORDER — LIDOCAINE HYDROCHLORIDE 20 MG/ML
INJECTION, SOLUTION EPIDURAL; INFILTRATION; INTRACAUDAL; PERINEURAL PRN
Status: DISCONTINUED | OUTPATIENT
Start: 2023-11-13 | End: 2023-11-13 | Stop reason: SDUPTHER

## 2023-11-13 RX ORDER — PROPOFOL 10 MG/ML
INJECTION, EMULSION INTRAVENOUS PRN
Status: DISCONTINUED | OUTPATIENT
Start: 2023-11-13 | End: 2023-11-13 | Stop reason: SDUPTHER

## 2023-11-13 RX ORDER — BUPIVACAINE HYDROCHLORIDE 2.5 MG/ML
INJECTION, SOLUTION EPIDURAL; INFILTRATION; INTRACAUDAL PRN
Status: DISCONTINUED | OUTPATIENT
Start: 2023-11-13 | End: 2023-11-13 | Stop reason: SDUPTHER

## 2023-11-13 RX ORDER — FENTANYL CITRATE 50 UG/ML
100 INJECTION, SOLUTION INTRAMUSCULAR; INTRAVENOUS
Status: DISCONTINUED | OUTPATIENT
Start: 2023-11-13 | End: 2023-11-13 | Stop reason: HOSPADM

## 2023-11-13 RX ORDER — EPHEDRINE SULFATE/0.9% NACL/PF 50 MG/5 ML
SYRINGE (ML) INTRAVENOUS PRN
Status: DISCONTINUED | OUTPATIENT
Start: 2023-11-13 | End: 2023-11-13 | Stop reason: SDUPTHER

## 2023-11-13 RX ADMIN — ROCURONIUM BROMIDE 10 MG: 10 INJECTION, SOLUTION INTRAVENOUS at 12:16

## 2023-11-13 RX ADMIN — BUPIVACAINE HYDROCHLORIDE 20 ML: 2.5 INJECTION, SOLUTION EPIDURAL; INFILTRATION; INTRACAUDAL; PERINEURAL at 11:07

## 2023-11-13 RX ADMIN — FENTANYL CITRATE 100 MCG: 50 INJECTION, SOLUTION INTRAMUSCULAR; INTRAVENOUS at 11:00

## 2023-11-13 RX ADMIN — CELECOXIB 100 MG: 100 CAPSULE ORAL at 09:45

## 2023-11-13 RX ADMIN — WATER 2000 MG: 1 INJECTION INTRAMUSCULAR; INTRAVENOUS; SUBCUTANEOUS at 12:26

## 2023-11-13 RX ADMIN — ROCURONIUM BROMIDE 10 MG: 10 INJECTION, SOLUTION INTRAVENOUS at 12:35

## 2023-11-13 RX ADMIN — DEXAMETHASONE SODIUM PHOSPHATE 8 MG: 4 INJECTION INTRA-ARTICULAR; INTRALESIONAL; INTRAMUSCULAR; INTRAVENOUS; SOFT TISSUE at 12:25

## 2023-11-13 RX ADMIN — LIDOCAINE HYDROCHLORIDE 100 MG: 20 INJECTION, SOLUTION EPIDURAL; INFILTRATION; INTRACAUDAL; PERINEURAL at 12:16

## 2023-11-13 RX ADMIN — FAMOTIDINE 20 MG: 10 INJECTION, SOLUTION INTRAVENOUS at 12:30

## 2023-11-13 RX ADMIN — PROPOFOL 200 MG: 10 INJECTION, EMULSION INTRAVENOUS at 12:16

## 2023-11-13 RX ADMIN — SODIUM CHLORIDE, POTASSIUM CHLORIDE, SODIUM LACTATE AND CALCIUM CHLORIDE: 600; 310; 30; 20 INJECTION, SOLUTION INTRAVENOUS at 09:49

## 2023-11-13 RX ADMIN — BUPIVACAINE 10 ML: 13.3 INJECTION, SUSPENSION, LIPOSOMAL INFILTRATION at 11:07

## 2023-11-13 RX ADMIN — ONDANSETRON 4 MG: 2 INJECTION INTRAMUSCULAR; INTRAVENOUS at 12:12

## 2023-11-13 RX ADMIN — Medication 120 MCG: at 12:30

## 2023-11-13 RX ADMIN — Medication 140 MG: at 12:16

## 2023-11-13 RX ADMIN — Medication 10 MG: at 12:21

## 2023-11-13 RX ADMIN — Medication 10 MG: at 12:32

## 2023-11-13 RX ADMIN — ACETAMINOPHEN 975 MG: 325 TABLET ORAL at 09:45

## 2023-11-13 ASSESSMENT — PAIN SCALES - GENERAL
PAINLEVEL_OUTOF10: 0
PAINLEVEL_OUTOF10: 0

## 2023-11-13 ASSESSMENT — PAIN - FUNCTIONAL ASSESSMENT
PAIN_FUNCTIONAL_ASSESSMENT: ACTIVITIES ARE NOT PREVENTED
PAIN_FUNCTIONAL_ASSESSMENT: NONE - DENIES PAIN

## 2023-11-13 NOTE — H&P
Date of Surgery Update:  Carla Knight was seen and examined. History and physical has been reviewed. The patient has been examined. There have been no significant clinical changes since the completion of the originally dated History and Physical.    Shoulder Arthroplasty   Carla Knight presented with advanced degenerative joint disease of the shoulder with radiographic evidence of severe joint space narrowing. Previous non-operative treatments have been tried including rest, ice, activity modifications, pain medications, and injectable treatments. The pain and impairment have progressively worsened now limiting ADLS and having a negative impact on quality of life. The risks, benefits and potential complications of the procedure have been discussed with the patient and all questions have been answered satisfactorily. The patient was counseled at length about the risks of malena Covid-19 during their perioperative period and any recovery window from their procedure. The patient was made aware that malena Covid-19  may worsen their prognosis for recovering from their procedure and lend to a higher morbidity and/or mortality risk. All material risks, benefits, and reasonable alternatives including postponing the procedure were discussed. The patient does wish to proceed with the procedure at this time.       Signed By: Mikaela Sheets MD     November 13, 2023 10:39 AM

## 2023-11-13 NOTE — BRIEF OP NOTE
BRIEF OPERATIVE NOTE    Date of Procedure: 11/13/2023  Preoperative Diagnosis: Osteoarthritis of glenohumeral joint, right [M19.011]  Postoperative Diagnosis: Same, biceps tendinitis  Procedure: Procedure(s):  RIGHT REVERSE SHOULDER ARTHROPLASTY WITH BICEPS TENODESIS AND AXILLARY NERVE DISSECTION WITH BONE GRAFT (GENERAL WITH EXPAREL REGIONAL NERVE BLOCK)    Surgeon: Martha Herrera MD  Assistant(s): Veronica Sandoval PA-C, ATC  Anesthesia: General   Estimated Blood Loss: minimal  Specimens: * No specimens in log *   Findings: Osteoarthritis. Complications: None  Implants:   Implant Name Type Inv.  Item Serial No.  Lot No. LRB No. Used Action   BASEPLATE LETI RVS SM STD 15 DEG 24 MM 10 MM W/ POST INSET - SNA  BASEPLATE LETI RVS SM STD 15 DEG 24 MM 10 MM W/ POST INSET NA SHOULDER INNOVATIONS [id number] Right 1 Implanted   SPHERE LETI OFFSET 6+ MM 0 MM 33 MM SHLDR COCR STRL INSET - SNA  SPHERE LETI OFFSET 6+ MM 0 MM 33 MM SHLDR COCR STRL INSET NA SHOULDER INNOVATIONS [id number] Right 1 Implanted   SCREW BNE COMPR 6X30 MM RVS BASEPLT CNTRL POST NS INSET - SNA  SCREW BNE COMPR 6X30 MM RVS BASEPLT CNTRL POST NS INSET NA SHOULDER INNOVATIONS NA Right 1 Implanted   SCREW BNE LCK 4.5X15 MM RVS BASEPLT PERPENDICULAR NS INSET - SNA  SCREW BNE LCK 4.5X15 MM RVS BASEPLT PERPENDICULAR NS INSET NA SHOULDER INNOVATIONS NA Right 2 Implanted   SCREW BNE LCK 4.5X25 MM RVS BASEPLT PERPENDICULAR NS INSET - SNA  SCREW BNE LCK 4.5X25 MM RVS BASEPLT PERPENDICULAR NS INSET NA SHOULDER INNOVATIONS NA Right 1 Implanted   COMPONENT HUM TY STD 0 MM 38 MM SHLDR RVS STRL INSET LTX - SNA  COMPONENT HUM TY STD 0 MM 38 MM SHLDR RVS STRL INSET LTX NA SHOULDER INNOVATIONS [id number] Right 1 Implanted   STEM HUM 1 SHT 32 MM 6 MM SHLDR POROUS TI STRL INSET LTX - SNA  STEM HUM 1 SHT 32 MM 6 MM SHLDR POROUS TI STRL INSET LTX NA SHOULDER INNOVATIONS [id number] Right 1 Implanted   BEARING HUM RVS STD NEUT 0 MM 0 DEG 33 MM SHLDR OFFSET INSET - SNA  BEARING HUM

## 2023-11-13 NOTE — ANESTHESIA PROCEDURE NOTES
Peripheral Block    Patient location during procedure: pre-op  Reason for block: procedure for pain, post-op pain management, primary anesthetic and at surgeon's request  Start time: 11/13/2023 11:00 AM  End time: 11/13/2023 11:07 AM  Staffing  Performed: anesthesiologist   Anesthesiologist: Yumiko Burch MD  Performed by: Yumiko Burch MD  Authorized by: Yumiko Burch MD    Preanesthetic Checklist  Completed: patient identified, IV checked, site marked, risks and benefits discussed, surgical/procedural consents, pre-op evaluation, timeout performed, anesthesia consent given, oxygen available and monitors applied/VS acknowledged  Peripheral Block   Patient position: supine  Prep: ChloraPrep  Provider prep: mask and sterile gloves  Patient monitoring: cardiac monitor, continuous pulse ox, continuous capnometry, frequent blood pressure checks, IV access, oxygen and responsive to questions  Block type: Brachial plexus  Interscalene  Laterality: right  Injection technique: single-shot  Guidance: ultrasound guided    Needle   Needle type: Other   Needle gauge: 22 G  Needle localization: ultrasound guidance  Needle length: 5 cmOther needle type: STIMUPLEX  Assessment   Injection assessment: negative aspiration for heme, no paresthesia on injection, local visualized surrounding nerve on ultrasound and no intravascular symptoms  Hemodynamics: stable  Outcomes: patient tolerated procedure well    Additional Notes  Fabiana Young RN witnessed timeout and block written on correct side.    Medications Administered  bupivacaine liposome (EXPAREL) injection 1.3% - Perineural   10 mL - 11/13/2023 11:07:00 AM

## 2023-11-13 NOTE — OP NOTE
OPERATIVE NOTE    Date of Procedure: 11/13/2023  Preoperative Diagnosis: Osteoarthritis of glenohumeral joint, right [M19.011]  Postoperative Diagnosis: Same, biceps tendinitis  Procedure: Procedure(s):  RIGHT REVERSE SHOULDER ARTHROPLASTY WITH BICEPS TENODESIS AND AXILLARY NERVE DISSECTION WITH BONE GRAFT (GENERAL WITH EXPAREL REGIONAL NERVE BLOCK)    Surgeon: Wyatt Khan MD  Assistant(s): Krissy Espinosa PA-C, ATC  Anesthesia: General   Estimated Blood Loss: minimal  Specimens: * No specimens in log *   Findings: Osteoarthritis. Complications: None  Implants:   Implant Name Type Inv.  Item Serial No.  Lot No. LRB No. Used Action   BASEPLATE LETI RVS SM STD 15 DEG 24 MM 10 MM W/ POST INSET - SNA  BASEPLATE LETI RVS SM STD 15 DEG 24 MM 10 MM W/ POST INSET NA SHOULDER INNOVATIONS [id number] Right 1 Implanted   SPHERE LETI OFFSET 6+ MM 0 MM 33 MM SHLDR COCR STRL INSET - SNA  SPHERE LETI OFFSET 6+ MM 0 MM 33 MM SHLDR COCR STRL INSET NA SHOULDER INNOVATIONS [id number] Right 1 Implanted   SCREW BNE COMPR 6X30 MM RVS BASEPLT CNTRL POST NS INSET - SNA  SCREW BNE COMPR 6X30 MM RVS BASEPLT CNTRL POST NS INSET NA SHOULDER INNOVATIONS NA Right 1 Implanted   SCREW BNE LCK 4.5X15 MM RVS BASEPLT PERPENDICULAR NS INSET - SNA  SCREW BNE LCK 4.5X15 MM RVS BASEPLT PERPENDICULAR NS INSET NA SHOULDER INNOVATIONS NA Right 2 Implanted   SCREW BNE LCK 4.5X25 MM RVS BASEPLT PERPENDICULAR NS INSET - SNA  SCREW BNE LCK 4.5X25 MM RVS BASEPLT PERPENDICULAR NS INSET NA SHOULDER INNOVATIONS NA Right 1 Implanted   COMPONENT HUM TY STD 0 MM 38 MM SHLDR RVS STRL INSET LTX - SNA  COMPONENT HUM TY STD 0 MM 38 MM SHLDR RVS STRL INSET LTX NA SHOULDER INNOVATIONS [id number] Right 1 Implanted   STEM HUM 1 SHT 32 MM 6 MM SHLDR POROUS TI STRL INSET LTX - SNA  STEM HUM 1 SHT 32 MM 6 MM SHLDR POROUS TI STRL INSET LTX NA SHOULDER INNOVATIONS [id number] Right 1 Implanted   BEARING HUM RVS STD NEUT 0 MM 0 DEG 33 MM SHLDR OFFSET INSET - SNA  BEARING HUM RVS STD

## 2023-11-13 NOTE — ANESTHESIA POSTPROCEDURE EVALUATION
Department of Anesthesiology  Postprocedure Note    Patient: Lennox Delgado  MRN: [id number]  YOB: 1940  Date of evaluation: 11/13/2023      Procedure Summary     Date: 11/13/23 Room / Location: [facility] MAIN OR  / [facility] MAIN OR    Anesthesia Start: 1211 Anesthesia Stop: 1290    Procedure: RIGHT REVERSE SHOULDER ARTHROPLASTY WITH BICEPS TENODESIS AND AXILLARY NERVE DISSECTION WITH BONE GRAFT (GENERAL WITH EXPAREL REGIONAL NERVE BLOCK) (Right: Shoulder) Diagnosis:       Osteoarthritis of glenohumeral joint, right      (Osteoarthritis of glenohumeral joint, right [M19.011])    Surgeons: Anastasia Velazco MD Responsible Provider: Nasima Vargas MD    Anesthesia Type: General, Regional ASA Status: 2          Anesthesia Type: General, Regional    Porfirio Phase I: Porfirio Score: 8    Porfirio Phase II:        Anesthesia Post Evaluation    Patient location during evaluation: PACU  Patient participation: complete - patient participated  Level of consciousness: awake  Pain score: 0  Airway patency: patent  Nausea & Vomiting: no nausea and no vomiting  Complications: no  Cardiovascular status: blood pressure returned to baseline  Respiratory status: acceptable  Hydration status: euvolemic  Multimodal analgesia pain management approach  Pain management: adequate

## 2023-11-13 NOTE — DISCHARGE INSTRUCTIONS
Shoulder Surgery Discharge Instructions  Dr. Shiv Ramirez    Please take the time to review the following instructions before you leave the hospital and use them as guidelines during your recovery from surgery. If you have any questions, you may contact my staff directly at [phone]. We are in clinic every morning and will be begin answering voicemails after 12:30pm.  If you need immediate assistance, call the main office number [phone]. The most efficient means of communication with the office is through [address] [place] [address]. Wound Care / Dressing Change    Two days after surgery you should remove the big, bulky white dressing. Do NOT remove the clear, plastic dressing against your skin. Keep the clear, plastic dressing intact until your follow up in the office. Showering / Bathing    If the clear plastic dressing is intact and there is no drainage, you may shower. If the dressing is loose or water gets under it please notify our office. If there is drainage, please call the office immediately. Sling    Keep your arm in the immobilizer/sling at all times except when showering, changing your clothes, and doing the exercises shown to you by Dr. Balbir Bone or your physical/occupational therapist prior to your discharge from the hospital.  Keep your arm at your side when changing your clothes and showering. Do not move it away from your body. Ice and Elevation    Ice therapy should be used consistently for 48 hours after surgery. Subsequently, you should ice 3 times per day for 20 minutes at a time. After the first week, you may use ice as needed for pain and swelling. Please ensure there is protective barrier between your skin and the ice. Diet    You may advance your regular diet as tolerated. Increase your clear liquid intake for the next 2-3 days. Medications  Your prescriptions were sent to the pharmacy on file.   We are unable

## 2025-06-09 ENCOUNTER — OFFICE VISIT (OUTPATIENT)
Age: 85
End: 2025-06-09
Payer: MEDICARE

## 2025-06-09 VITALS
BODY MASS INDEX: 23.62 KG/M2 | SYSTOLIC BLOOD PRESSURE: 172 MMHG | OXYGEN SATURATION: 99 % | DIASTOLIC BLOOD PRESSURE: 91 MMHG | HEART RATE: 87 BPM | WEIGHT: 165 LBS | HEIGHT: 70 IN

## 2025-06-09 DIAGNOSIS — R97.20 ELEVATED PSA: ICD-10-CM

## 2025-06-09 DIAGNOSIS — R97.20 ELEVATED PSA: Primary | ICD-10-CM

## 2025-06-09 PROCEDURE — 99203 OFFICE O/P NEW LOW 30 MIN: CPT | Performed by: STUDENT IN AN ORGANIZED HEALTH CARE EDUCATION/TRAINING PROGRAM

## 2025-06-09 PROCEDURE — 1123F ACP DISCUSS/DSCN MKR DOCD: CPT | Performed by: STUDENT IN AN ORGANIZED HEALTH CARE EDUCATION/TRAINING PROGRAM

## 2025-06-09 PROCEDURE — 1159F MED LIST DOCD IN RCRD: CPT | Performed by: STUDENT IN AN ORGANIZED HEALTH CARE EDUCATION/TRAINING PROGRAM

## 2025-06-09 PROCEDURE — 1036F TOBACCO NON-USER: CPT | Performed by: STUDENT IN AN ORGANIZED HEALTH CARE EDUCATION/TRAINING PROGRAM

## 2025-06-09 PROCEDURE — G8420 CALC BMI NORM PARAMETERS: HCPCS | Performed by: STUDENT IN AN ORGANIZED HEALTH CARE EDUCATION/TRAINING PROGRAM

## 2025-06-09 PROCEDURE — G8427 DOCREV CUR MEDS BY ELIG CLIN: HCPCS | Performed by: STUDENT IN AN ORGANIZED HEALTH CARE EDUCATION/TRAINING PROGRAM

## 2025-06-09 PROCEDURE — 1126F AMNT PAIN NOTED NONE PRSNT: CPT | Performed by: STUDENT IN AN ORGANIZED HEALTH CARE EDUCATION/TRAINING PROGRAM

## 2025-06-09 NOTE — PROGRESS NOTES
Chief Complaint   Patient presents with    New Patient    Elevated PSA     1. Have you been to the ER, urgent care clinic since your last visit?  Hospitalized since your last visit?No    2. Have you seen or consulted any other health care providers outside of the [facility] since your last visit?  Include any pap smears or colon screening. Yes When: 6/4/25 Where: Dr. Wiflredo Rust Reason for visit: Primary Care  BP (!) 172/91   Pulse 87   Ht 1.778 m (5' 10\")   Wt 74.8 kg (165 lb)   SpO2 99%   BMI 23.68 kg/m²

## 2025-06-09 NOTE — PROGRESS NOTES
HISTORY OF PRESENT ILLNESS    History of Present Illness  The patient presents for evaluation of an elevated PSA level.    His most recent blood test last week indicated an elevated PSA level. He has no history of elevated PSA levels; his last PSA test was 3 years ago. He reports no urologic issues such as difficulty urinating, hematuria, or nephrolithiasis. Stool tests every 6 months have been negative. He has undergone multiple MRIs, none specifically for the prostate. He has a history of approximately 40 surgeries, including 4 knee replacements, bilateral shoulder replacements, and TAVR. He spent a year in the hospital following these procedures. He recalls a significant rash around his waist at the time of his lab work and was unable to consult with a dermatologist. He was prescribed prednisone by his PCP during this period.    MEDICATIONS  - Previously took Prednisone    PAST MEDICAL HISTORY:  PMHx (including negatives):  has a past medical history of Aortic valve stenosis, Arthritis, Cataracts, bilateral, Chronic back pain, COVID-19, History of blood transfusion, Hypercholesteremia, Hypertension, Macular degeneration, Psychiatric disorder, Tinnitus, and Unspecified adverse effect of anesthesia.   PSurgHx:  has a past surgical history that includes orthopedic surgery (Left, 03/2022); Refractive surgery (Bilateral); orthopedic surgery (1966); Adenoidectomy; Total shoulder arthroplasty (Left, 02/2018); Carpal tunnel release (Left, 03/2015); Colonoscopy; Upper gastrointestinal endoscopy; Total knee arthroplasty (Right, 1997); Total knee arthroplasty (Right); Knee arthroscopy (Left, 01/2015); Total knee arthroplasty (Left, 11/2015); Cardiac catheterization (2004); Cardiac catheterization (03/30/2023); transcatheter aortic valve replacement (05/16/2023); shoulder surgery (Right, 11/13/2023); and Cardiac valve replacement (05/16/2023).  PSocHx:  reports that he has never smoked. He has never used smokeless tobacco.

## 2025-07-30 PROBLEM — R97.20 ELEVATED PSA: Status: ACTIVE | Noted: 2025-07-30

## 2025-07-30 LAB — PSA SERPL-MCNC: 7.7 NG/ML (ref 0–4)

## 2025-08-05 ENCOUNTER — OFFICE VISIT (OUTPATIENT)
Age: 85
End: 2025-08-05
Payer: MEDICARE

## 2025-08-05 VITALS
HEART RATE: 81 BPM | WEIGHT: 165 LBS | HEIGHT: 70 IN | BODY MASS INDEX: 23.62 KG/M2 | SYSTOLIC BLOOD PRESSURE: 159 MMHG | DIASTOLIC BLOOD PRESSURE: 89 MMHG | OXYGEN SATURATION: 100 %

## 2025-08-05 DIAGNOSIS — R97.20 ELEVATED PSA: Primary | ICD-10-CM

## 2025-08-05 PROCEDURE — 1159F MED LIST DOCD IN RCRD: CPT | Performed by: STUDENT IN AN ORGANIZED HEALTH CARE EDUCATION/TRAINING PROGRAM

## 2025-08-05 PROCEDURE — G8427 DOCREV CUR MEDS BY ELIG CLIN: HCPCS | Performed by: STUDENT IN AN ORGANIZED HEALTH CARE EDUCATION/TRAINING PROGRAM

## 2025-08-05 PROCEDURE — 1036F TOBACCO NON-USER: CPT | Performed by: STUDENT IN AN ORGANIZED HEALTH CARE EDUCATION/TRAINING PROGRAM

## 2025-08-05 PROCEDURE — 1126F AMNT PAIN NOTED NONE PRSNT: CPT | Performed by: STUDENT IN AN ORGANIZED HEALTH CARE EDUCATION/TRAINING PROGRAM

## 2025-08-05 PROCEDURE — G8420 CALC BMI NORM PARAMETERS: HCPCS | Performed by: STUDENT IN AN ORGANIZED HEALTH CARE EDUCATION/TRAINING PROGRAM

## 2025-08-05 PROCEDURE — 1123F ACP DISCUSS/DSCN MKR DOCD: CPT | Performed by: STUDENT IN AN ORGANIZED HEALTH CARE EDUCATION/TRAINING PROGRAM

## 2025-08-05 PROCEDURE — 99213 OFFICE O/P EST LOW 20 MIN: CPT | Performed by: STUDENT IN AN ORGANIZED HEALTH CARE EDUCATION/TRAINING PROGRAM

## 2025-08-26 ENCOUNTER — HOSPITAL ENCOUNTER (OUTPATIENT)
Facility: HOSPITAL | Age: 85
Discharge: HOME | End: 2025-08-29
Attending: STUDENT IN AN ORGANIZED HEALTH CARE EDUCATION/TRAINING PROGRAM
Payer: MEDICARE

## 2025-08-26 PROCEDURE — 72197 MRI PELVIS W/O & W/DYE: CPT

## 2025-08-26 PROCEDURE — A9579 GAD-BASE MR CONTRAST NOS,1ML: HCPCS | Performed by: NURSE PRACTITIONER

## 2025-08-26 PROCEDURE — 6360000004 HC RX CONTRAST MEDICATION: Performed by: NURSE PRACTITIONER

## 2025-08-26 RX ORDER — GADOTERIDOL 279.3 MG/ML
15 INJECTION INTRAVENOUS
Status: COMPLETED | OUTPATIENT
Start: 2025-08-26 | End: 2025-08-26

## 2025-08-26 RX ORDER — 0.9 % SODIUM CHLORIDE 0.9 %
100 INTRAVENOUS SOLUTION INTRAVENOUS ONCE
Status: DISCONTINUED | OUTPATIENT
Start: 2025-08-26 | End: 2025-08-30 | Stop reason: HOSPADM

## 2025-08-26 RX ADMIN — GADOTERIDOL 15 ML: 279.3 INJECTION, SOLUTION INTRAVENOUS at 17:54

## 2025-09-05 ENCOUNTER — OFFICE VISIT (OUTPATIENT)
Age: 85
End: 2025-09-05
Payer: MEDICARE

## 2025-09-05 VITALS
BODY MASS INDEX: 23.62 KG/M2 | OXYGEN SATURATION: 96 % | WEIGHT: 165 LBS | SYSTOLIC BLOOD PRESSURE: 165 MMHG | HEIGHT: 70 IN | DIASTOLIC BLOOD PRESSURE: 89 MMHG | HEART RATE: 77 BPM

## 2025-09-05 DIAGNOSIS — R97.20 ELEVATED PSA: Primary | ICD-10-CM

## 2025-09-05 PROCEDURE — 99214 OFFICE O/P EST MOD 30 MIN: CPT | Performed by: STUDENT IN AN ORGANIZED HEALTH CARE EDUCATION/TRAINING PROGRAM

## 2025-09-05 PROCEDURE — 1123F ACP DISCUSS/DSCN MKR DOCD: CPT | Performed by: STUDENT IN AN ORGANIZED HEALTH CARE EDUCATION/TRAINING PROGRAM

## 2025-09-05 PROCEDURE — 1159F MED LIST DOCD IN RCRD: CPT | Performed by: STUDENT IN AN ORGANIZED HEALTH CARE EDUCATION/TRAINING PROGRAM

## 2025-09-05 PROCEDURE — G8420 CALC BMI NORM PARAMETERS: HCPCS | Performed by: STUDENT IN AN ORGANIZED HEALTH CARE EDUCATION/TRAINING PROGRAM

## 2025-09-05 PROCEDURE — 1126F AMNT PAIN NOTED NONE PRSNT: CPT | Performed by: STUDENT IN AN ORGANIZED HEALTH CARE EDUCATION/TRAINING PROGRAM

## 2025-09-05 PROCEDURE — 1036F TOBACCO NON-USER: CPT | Performed by: STUDENT IN AN ORGANIZED HEALTH CARE EDUCATION/TRAINING PROGRAM

## 2025-09-05 PROCEDURE — G8427 DOCREV CUR MEDS BY ELIG CLIN: HCPCS | Performed by: STUDENT IN AN ORGANIZED HEALTH CARE EDUCATION/TRAINING PROGRAM

## 2025-09-05 RX ORDER — SENNOSIDES 8.6 MG
650 CAPSULE ORAL
COMMUNITY

## 2025-09-05 RX ORDER — BISACODYL 5 MG/1
5 TABLET, DELAYED RELEASE ORAL DAILY PRN
Qty: 2 TABLET | Refills: 0 | Status: SHIPPED | OUTPATIENT
Start: 2025-09-05

## 2025-09-05 RX ORDER — CIPROFLOXACIN 500 MG/1
500 TABLET, FILM COATED ORAL 2 TIMES DAILY
Qty: 10 TABLET | Refills: 0 | Status: SHIPPED | OUTPATIENT
Start: 2025-09-05 | End: 2025-09-10

## 2025-09-05 RX ORDER — OMEPRAZOLE 40 MG/1
40 CAPSULE, DELAYED RELEASE ORAL DAILY
COMMUNITY

## (undated) DEVICE — PAD,ABDOMINAL,10X30,MULTI-TRAUMA,STRL: Brand: MEDLINE INDUSTRIES, INC.

## (undated) DEVICE — 3M™ TEGADERM™ TRANSPARENT FILM DRESSING FRAME STYLE, 1628, 6 IN X 8 IN (15 CM X 20 CM), 10/CT 8CT/CASE: Brand: 3M™ TEGADERM™

## (undated) DEVICE — 4-PORT MANIFOLD: Brand: NEPTUNE 2

## (undated) DEVICE — PLASMABLADE PS210-030S 3.0S LOCK: Brand: PLASMABLADE™

## (undated) DEVICE — GLOVE SURG SZ 7 L12IN FNGR THK79MIL GRN LTX FREE

## (undated) DEVICE — DRAPE,U/ SHT,SPLIT,PLAS,STERIL: Brand: MEDLINE

## (undated) DEVICE — COVER,MAYO STAND,STERILE: Brand: MEDLINE

## (undated) DEVICE — CEMENT BNE SIMPLEX W/GENT -- 10/PK: Type: IMPLANTABLE DEVICE | Site: SHOULDER | Status: FUNCTIONAL

## (undated) DEVICE — SOLUTION IRRIG 3000ML 0.9% SOD CHL USP UROMATIC PLAS CONT

## (undated) DEVICE — BOOT ORTH XL KNEE GRN TYVEK HI CVR SKID RESIST HEAT SEAL E

## (undated) DEVICE — (D)PREP SKN CHLRAPRP APPL 26ML -- CONVERT TO ITEM 371833

## (undated) DEVICE — STERILE POLYISOPRENE POWDER-FREE SURGICAL GLOVES WITH EMOLLIENT COATING: Brand: PROTEXIS

## (undated) DEVICE — Device

## (undated) DEVICE — SUTURE ABSRB BRAID COAT UD CT NO 1 36IN VCRL J959H

## (undated) DEVICE — PAD,ABDOMINAL,5"X9",ST,LF,25/BX: Brand: MEDLINE INDUSTRIES, INC.

## (undated) DEVICE — PENCIL SMK EVAC ALL IN 1 DSGN ENH VISIBILITY IMPROVED AIR

## (undated) DEVICE — BLADE SURG 90DEG BVL UP LAMLLR

## (undated) DEVICE — SUTURE ETHLN SZ 4-0 L18IN NONABSORBABLE BLK L19MM PS-2 3/8 1667H

## (undated) DEVICE — TIBURON SPLIT SHEET: Brand: CONVERTORS

## (undated) DEVICE — DRESSING GZ FLUF 36X36 IN RND 2 PLY PD GZ AMER WHT CROSS

## (undated) DEVICE — BUR SURG HD L5MM DIA5MM RND FLUT REPL CARB LINVATEC

## (undated) DEVICE — BLADE, TONGUE, 6", STERILE: Brand: MEDLINE

## (undated) DEVICE — GLOVE ORANGE PI 8   MSG9080

## (undated) DEVICE — BUTTON SUT DIA12MM TI FOR PRI BKUP FIBERWIRE FIX OF ACL PCL

## (undated) DEVICE — SUTURE VCRL SZ 2-0 L27IN ABSRB UD L36MM CP-1 1/2 CIR REV J266H

## (undated) DEVICE — E-Z CLEAN, NON-STICK, PTFE COATED, ELECTROSURGICAL BLADE ELECTRODE, MODIFIED EXTENDED INSULATION, 4 INCH (10.2 CM): Brand: MEGADYNE

## (undated) DEVICE — STERILE POLYISOPRENE POWDER-FREE SURGICAL GLOVES: Brand: PROTEXIS

## (undated) DEVICE — IMPLANTABLE DEVICE
Type: IMPLANTABLE DEVICE | Site: SHOULDER | Status: FUNCTIONAL
Brand: COMPREHENSIVE SHOULDER SYSTEM

## (undated) DEVICE — BANDAGE COBAN 4 IN COMPR W4INXL5YD FOAM COHESIVE QUIK STK SELF ADH SFT

## (undated) DEVICE — SUTURE FIBERWIRE SZ 2 W/ TAPERED NEEDLE BLUE L38IN NONABSORB BLU L26.5MM 1/2 CIRCLE AR7200

## (undated) DEVICE — 3M™ MEDIPORE™ H SOFT CLOTH SURGICAL TAPE 2864, 4 INCH X 10 YARD (10CM X 9,14M), 12 ROLLS/CASE: Brand: 3M™ MEDIPORE™

## (undated) DEVICE — GLOVE SURG SZ 65 L12IN FNGR THK126MIL CRM LTX FREE

## (undated) DEVICE — 3M™ IOBAN™ 2 ANTIMICROBIAL INCISE DRAPE 6650EZ: Brand: IOBAN™ 2

## (undated) DEVICE — TUBING, SUCTION, 1/4" X 12', STRAIGHT: Brand: MEDLINE

## (undated) DEVICE — HAND-SFMCASU: Brand: MEDLINE INDUSTRIES, INC.

## (undated) DEVICE — SUT SLK 2-0SH 30IN BLK --

## (undated) DEVICE — HANDPIECE SET WITH COAXIAL HIGH FLOW TIP AND SUCTION TUBE: Brand: INTERPULSE

## (undated) DEVICE — BIT DRL L5IN DIA2.8MM STD ST S STL TWST BUSA

## (undated) DEVICE — DERMABOND SKIN ADH 0.7ML -- DERMABOND ADVANCED 12/BX

## (undated) DEVICE — SYRINGE CATH TIP 50ML

## (undated) DEVICE — X-RAY SPONGES,16 PLY: Brand: DERMACEA

## (undated) DEVICE — T-MAX DISPOSABLE FACE MASK 8 PER BOX

## (undated) DEVICE — SOL IRRIGATION INJ NACL 0.9% 500ML BTL

## (undated) DEVICE — ZIMMER® STERILE DISPOSABLE TOURNIQUET CUFF WITH PROTECTIVE SLEEVE AND PLC, DUAL PORT, SINGLE BLADDER, 18 IN. (46 CM)

## (undated) DEVICE — SUTURE MCRYL SZ 3-0 L27IN ABSRB UD L24MM PS-1 3/8 CIR PRIM Y936H

## (undated) DEVICE — SPONGE GZ W4XL4IN COT 12 PLY TYP VII WVN C FLD DSGN STERILE

## (undated) DEVICE — NEEDLE SUT L35MM STD ROT CUF HALF CIR

## (undated) DEVICE — GLOVE SURG SZ 9 L12IN FNGR THK79MIL GRN LTX FREE

## (undated) DEVICE — SUTURE FIBERWIRE 3-0 L18IN NONABSORBABLE BLU L15MM 3/8 CIR AR722701

## (undated) DEVICE — TOTAL JOINT-SFMC: Brand: MEDLINE INDUSTRIES, INC.

## (undated) DEVICE — GOWN,BREATHABLE,IMP SLV 3XL AURORA: Brand: MEDLINE

## (undated) DEVICE — SUTURE VCRL SZ 0 L36IN ABSRB UD L40MM CT 1/2 CIR TAPERPOINT J958H

## (undated) DEVICE — ARGYLE FRAZIER SURGICAL SUCTION INSTRUMENT 10 FR/CH (3.3 MM): Brand: ARGYLE

## (undated) DEVICE — INFECTION CONTROL KIT SYS

## (undated) DEVICE — SOLUTION IV 1000ML 0.9% SOD CHL

## (undated) DEVICE — 3M™ IOBAN™ 2 ANTIMICROBIAL INCISE DRAPE 6651EZ: Brand: IOBAN™ 2

## (undated) DEVICE — STRYKER PERFORMANCE SERIES SAGITTAL BLADE: Brand: STRYKER PERFORMANCE SERIES

## (undated) DEVICE — CEMENT MIXING SYSTEM WITH FEMORAL BREAKWAY NOZZLE: Brand: REVOLUTION

## (undated) DEVICE — SHEET, DRAPE, SPLIT, STERILE: Brand: MEDLINE

## (undated) DEVICE — DEVON™ KNEE AND BODY STRAP 60" X 3" (1.5 M X 7.6 CM): Brand: DEVON

## (undated) DEVICE — STEM HUM L83MM DIA15MM MINI UNIV CO CHROM POR PRI PRESSFIT: Type: IMPLANTABLE DEVICE | Site: SHOULDER | Status: FUNCTIONAL

## (undated) DEVICE — REM POLYHESIVE ADULT PATIENT RETURN ELECTRODE: Brand: VALLEYLAB

## (undated) DEVICE — TUBING SUCT 10FR MAL ALUM SHFT FN CAP VENT UNIV CONN W/ OBT

## (undated) DEVICE — GLOVE SURG SZ 9 L12IN FNGR THK126MIL CRM LTX FREE

## (undated) DEVICE — PAD,NON-ADHERENT,3X8,STERILE,LF,1/PK: Brand: MEDLINE

## (undated) DEVICE — COVER LT HNDL BLU PLAS

## (undated) DEVICE — DRAPE,REIN 53X77,STERILE: Brand: MEDLINE

## (undated) DEVICE — SUTURE PDS II SZ 0 L36IN ABSRB VLT L40MM CT 1/2 CIR Z358T

## (undated) DEVICE — T4 HOOD

## (undated) DEVICE — MEDI-VAC NON-CONDUCTIVE SUCTION TUBING: Brand: CARDINAL HEALTH

## (undated) DEVICE — GLOVE SURG SZ 65 THK91MIL LTX FREE SYN POLYISOPRENE

## (undated) DEVICE — BLADE SAW W5.5XL25MM THK0.38MM CUT THK0.43MM REPL S STL SAG

## (undated) DEVICE — TELFA NON-ADHERENT ABSORBENT DRESSING: Brand: TELFA

## (undated) DEVICE — IMPLANTABLE DEVICE
Type: IMPLANTABLE DEVICE | Site: SHOULDER | Status: FUNCTIONAL
Brand: HYBRID GLENOID

## (undated) DEVICE — SUTURE FIBERWIRE SZ 5 L38IN BLU L48MM 1/2 CIR CONVENTIONAL AR7213

## (undated) DEVICE — YANKAUER OPEN TIP, NO VENT: Brand: ARGYLE

## (undated) DEVICE — YANKAUER,OPEN TIP,W/O VENT,STERILE: Brand: MEDLINE INDUSTRIES, INC.

## (undated) DEVICE — 450 ML BOTTLE OF 0.05% CHLORHEXIDINE GLUCONATE IN 99.95% STERILE WATER FOR IRRIGATION, USP AND APPLICATOR.: Brand: IRRISEPT ANTIMICROBIAL WOUND LAVAGE

## (undated) DEVICE — HOOD, PEEL-AWAY: Brand: FLYTE

## (undated) DEVICE — SOLUTION IRRIG 3000ML 0.9% SOD CHL FLX CONT 0797208] ICU MEDICAL INC]

## (undated) DEVICE — SUTURE MCRYL SZ 4-0 L27IN ABSRB UD L19MM PS-2 1/2 CIR PRIM Y426H

## (undated) DEVICE — INTENDED FOR TISSUE SEPARATION, AND OTHER PROCEDURES THAT REQUIRE A SHARP SURGICAL BLADE TO PUNCTURE OR CUT.: Brand: BARD-PARKER ® CARBON RIB-BACK BLADES

## (undated) DEVICE — HANDPIECE SET WITH COAXIAL FAN SPRAY TIP AND SUCTION TUBE: Brand: INTERPULSE

## (undated) DEVICE — SUTURE FIBERWIRE 4-0 L18IN NONABSORBABLE BLU L12.3MM 3/8 AR723001

## (undated) DEVICE — SOLUTION IRRIG 1000ML STRL H2O USP PLAS POUR BTL

## (undated) DEVICE — BLADE OPHTH 180DEG CUT SURF BLU STR SHRP DBL BVL GRINDLESS